# Patient Record
Sex: FEMALE | Race: OTHER | HISPANIC OR LATINO | URBAN - METROPOLITAN AREA
[De-identification: names, ages, dates, MRNs, and addresses within clinical notes are randomized per-mention and may not be internally consistent; named-entity substitution may affect disease eponyms.]

---

## 2018-09-01 ENCOUNTER — INPATIENT (INPATIENT)
Facility: HOSPITAL | Age: 73
LOS: 1 days | Discharge: HOME | DRG: 603 | End: 2018-09-03
Attending: FAMILY MEDICINE | Admitting: HOSPITALIST
Payer: COMMERCIAL

## 2018-09-01 VITALS
WEIGHT: 102.96 LBS | SYSTOLIC BLOOD PRESSURE: 144 MMHG | DIASTOLIC BLOOD PRESSURE: 82 MMHG | HEIGHT: 62 IN | HEART RATE: 78 BPM | RESPIRATION RATE: 16 BRPM | TEMPERATURE: 99 F | OXYGEN SATURATION: 97 %

## 2018-09-01 DIAGNOSIS — Z90.710 ACQUIRED ABSENCE OF BOTH CERVIX AND UTERUS: Chronic | ICD-10-CM

## 2018-09-01 RX ORDER — PIPERACILLIN AND TAZOBACTAM 4; .5 G/20ML; G/20ML
3.38 INJECTION, POWDER, LYOPHILIZED, FOR SOLUTION INTRAVENOUS ONCE
Qty: 0 | Refills: 0 | Status: COMPLETED | OUTPATIENT
Start: 2018-09-01 | End: 2018-09-02

## 2018-09-01 RX ORDER — SODIUM CHLORIDE 9 MG/ML
1000 INJECTION INTRAMUSCULAR; INTRAVENOUS; SUBCUTANEOUS ONCE
Qty: 0 | Refills: 0 | Status: COMPLETED | OUTPATIENT
Start: 2018-09-01 | End: 2018-09-01

## 2018-09-01 RX ORDER — VANCOMYCIN HCL 1 G
1000 VIAL (EA) INTRAVENOUS ONCE
Qty: 0 | Refills: 0 | Status: COMPLETED | OUTPATIENT
Start: 2018-09-01 | End: 2018-09-02

## 2018-09-01 RX ADMIN — SODIUM CHLORIDE 125 MILLILITER(S): 9 INJECTION INTRAMUSCULAR; INTRAVENOUS; SUBCUTANEOUS at 23:55

## 2018-09-01 NOTE — ED PROVIDER NOTE - OBJECTIVE STATEMENT
right leg red & swollen about 1 week  sen by urgent care 8/30 & placed on doxycycline  swelling of lower extremities 73 y/o HF h/o dementia, C/C right leg red & swollen about 5 days, PMD initiated doxycycline orally x 2 days. The  notes increased swelling and redness while on the AB. She was told to go to the hospital by PMD Dr Strauss. No CP, no SOB, no fever, no chills, no stroke symptoms.

## 2018-09-01 NOTE — ED ADULT NURSE NOTE - NSIMPLEMENTINTERV_GEN_ALL_ED
Implemented All Fall Risk Interventions:  [place] to call system. Call bell, personal items and telephone within reach. Instruct patient to call for assistance. Room bathroom lighting operational. Non-slip footwear when patient is off stretcher. Physically safe environment: no spills, clutter or unnecessary equipment. Stretcher in lowest position, wheels locked, appropriate side rails in place. Provide visual cue, wrist band, yellow gown, etc. Monitor gait and stability. Monitor for mental status changes and reorient to person, place, and time. Review medications for side effects contributing to fall risk. Reinforce activity limits and safety measures with patient and family.

## 2018-09-02 DIAGNOSIS — B35.1 TINEA UNGUIUM: ICD-10-CM

## 2018-09-02 DIAGNOSIS — L03.90 CELLULITIS, UNSPECIFIED: ICD-10-CM

## 2018-09-02 DIAGNOSIS — R09.89 OTHER SPECIFIED SYMPTOMS AND SIGNS INVOLVING THE CIRCULATORY AND RESPIRATORY SYSTEMS: ICD-10-CM

## 2018-09-02 DIAGNOSIS — L03.115 CELLULITIS OF RIGHT LOWER LIMB: ICD-10-CM

## 2018-09-02 DIAGNOSIS — G30.9 ALZHEIMER'S DISEASE, UNSPECIFIED: ICD-10-CM

## 2018-09-02 DIAGNOSIS — Z29.9 ENCOUNTER FOR PROPHYLACTIC MEASURES, UNSPECIFIED: ICD-10-CM

## 2018-09-02 DIAGNOSIS — F32.9 MAJOR DEPRESSIVE DISORDER, SINGLE EPISODE, UNSPECIFIED: ICD-10-CM

## 2018-09-02 LAB
ALBUMIN SERPL ELPH-MCNC: 3.2 G/DL — LOW (ref 3.3–5)
ALP SERPL-CCNC: 55 U/L — SIGNIFICANT CHANGE UP (ref 40–120)
ALT FLD-CCNC: 17 U/L — SIGNIFICANT CHANGE UP (ref 12–78)
ANION GAP SERPL CALC-SCNC: 7 MMOL/L — SIGNIFICANT CHANGE UP (ref 5–17)
AST SERPL-CCNC: 17 U/L — SIGNIFICANT CHANGE UP (ref 15–37)
BILIRUB SERPL-MCNC: 0.3 MG/DL — SIGNIFICANT CHANGE UP (ref 0.2–1.2)
BUN SERPL-MCNC: 17 MG/DL — SIGNIFICANT CHANGE UP (ref 7–23)
CALCIUM SERPL-MCNC: 8.5 MG/DL — SIGNIFICANT CHANGE UP (ref 8.5–10.1)
CHLORIDE SERPL-SCNC: 107 MMOL/L — SIGNIFICANT CHANGE UP (ref 96–108)
CO2 SERPL-SCNC: 28 MMOL/L — SIGNIFICANT CHANGE UP (ref 22–31)
CREAT SERPL-MCNC: 0.65 MG/DL — SIGNIFICANT CHANGE UP (ref 0.5–1.3)
ERYTHROCYTE [SEDIMENTATION RATE] IN BLOOD: 12 MM/HR — SIGNIFICANT CHANGE UP (ref 0–20)
GLUCOSE SERPL-MCNC: 101 MG/DL — HIGH (ref 70–99)
HCT VFR BLD CALC: 39.2 % — SIGNIFICANT CHANGE UP (ref 34.5–45)
HGB BLD-MCNC: 12.9 G/DL — SIGNIFICANT CHANGE UP (ref 11.5–15.5)
LACTATE SERPL-SCNC: 0.9 MMOL/L — SIGNIFICANT CHANGE UP (ref 0.7–2)
MCHC RBC-ENTMCNC: 29.9 PG — SIGNIFICANT CHANGE UP (ref 27–34)
MCHC RBC-ENTMCNC: 32.9 GM/DL — SIGNIFICANT CHANGE UP (ref 32–36)
MCV RBC AUTO: 91 FL — SIGNIFICANT CHANGE UP (ref 80–100)
NRBC # BLD: 0 /100 WBCS — SIGNIFICANT CHANGE UP (ref 0–0)
PLATELET # BLD AUTO: 246 K/UL — SIGNIFICANT CHANGE UP (ref 150–400)
POTASSIUM SERPL-MCNC: 4.1 MMOL/L — SIGNIFICANT CHANGE UP (ref 3.5–5.3)
POTASSIUM SERPL-SCNC: 4.1 MMOL/L — SIGNIFICANT CHANGE UP (ref 3.5–5.3)
PROCALCITONIN SERPL-MCNC: <0.05 — SIGNIFICANT CHANGE UP (ref 0–0.04)
PROT SERPL-MCNC: 7 G/DL — SIGNIFICANT CHANGE UP (ref 6–8.3)
RBC # BLD: 4.31 M/UL — SIGNIFICANT CHANGE UP (ref 3.8–5.2)
RBC # FLD: 12.7 % — SIGNIFICANT CHANGE UP (ref 10.3–14.5)
SODIUM SERPL-SCNC: 142 MMOL/L — SIGNIFICANT CHANGE UP (ref 135–145)
WBC # BLD: 7.79 K/UL — SIGNIFICANT CHANGE UP (ref 3.8–10.5)
WBC # FLD AUTO: 7.79 K/UL — SIGNIFICANT CHANGE UP (ref 3.8–10.5)

## 2018-09-02 PROCEDURE — 99223 1ST HOSP IP/OBS HIGH 75: CPT | Mod: AI

## 2018-09-02 PROCEDURE — 99285 EMERGENCY DEPT VISIT HI MDM: CPT

## 2018-09-02 PROCEDURE — 73610 X-RAY EXAM OF ANKLE: CPT | Mod: 26,RT

## 2018-09-02 PROCEDURE — 93010 ELECTROCARDIOGRAM REPORT: CPT

## 2018-09-02 PROCEDURE — 12345: CPT | Mod: NC

## 2018-09-02 PROCEDURE — 93970 EXTREMITY STUDY: CPT | Mod: 26

## 2018-09-02 PROCEDURE — 73630 X-RAY EXAM OF FOOT: CPT | Mod: 26,RT

## 2018-09-02 PROCEDURE — 71045 X-RAY EXAM CHEST 1 VIEW: CPT | Mod: 26

## 2018-09-02 RX ORDER — ACETAMINOPHEN 500 MG
650 TABLET ORAL EVERY 6 HOURS
Qty: 0 | Refills: 0 | Status: DISCONTINUED | OUTPATIENT
Start: 2018-09-02 | End: 2018-09-03

## 2018-09-02 RX ORDER — MIRTAZAPINE 45 MG/1
30 TABLET, ORALLY DISINTEGRATING ORAL AT BEDTIME
Qty: 0 | Refills: 0 | Status: DISCONTINUED | OUTPATIENT
Start: 2018-09-02 | End: 2018-09-03

## 2018-09-02 RX ORDER — INFLUENZA VIRUS VACCINE 15; 15; 15; 15 UG/.5ML; UG/.5ML; UG/.5ML; UG/.5ML
0.5 SUSPENSION INTRAMUSCULAR ONCE
Qty: 0 | Refills: 0 | Status: DISCONTINUED | OUTPATIENT
Start: 2018-09-02 | End: 2018-09-03

## 2018-09-02 RX ORDER — ENOXAPARIN SODIUM 100 MG/ML
40 INJECTION SUBCUTANEOUS EVERY 24 HOURS
Qty: 0 | Refills: 0 | Status: DISCONTINUED | OUTPATIENT
Start: 2018-09-02 | End: 2018-09-03

## 2018-09-02 RX ORDER — SOD,AMMONIUM,POTASSIUM LACTATE
1 CREAM (GRAM) TOPICAL
Qty: 0 | Refills: 0 | Status: DISCONTINUED | OUTPATIENT
Start: 2018-09-02 | End: 2018-09-03

## 2018-09-02 RX ORDER — PIPERACILLIN AND TAZOBACTAM 4; .5 G/20ML; G/20ML
3.38 INJECTION, POWDER, LYOPHILIZED, FOR SOLUTION INTRAVENOUS EVERY 8 HOURS
Qty: 0 | Refills: 0 | Status: DISCONTINUED | OUTPATIENT
Start: 2018-09-02 | End: 2018-09-02

## 2018-09-02 RX ORDER — CEFAZOLIN SODIUM 1 G
1000 VIAL (EA) INJECTION EVERY 8 HOURS
Qty: 0 | Refills: 0 | Status: DISCONTINUED | OUTPATIENT
Start: 2018-09-02 | End: 2018-09-03

## 2018-09-02 RX ORDER — VANCOMYCIN HCL 1 G
750 VIAL (EA) INTRAVENOUS EVERY 12 HOURS
Qty: 0 | Refills: 0 | Status: DISCONTINUED | OUTPATIENT
Start: 2018-09-02 | End: 2018-09-02

## 2018-09-02 RX ORDER — LACTOBACILLUS ACIDOPHILUS 100MM CELL
1 CAPSULE ORAL
Qty: 0 | Refills: 0 | Status: DISCONTINUED | OUTPATIENT
Start: 2018-09-02 | End: 2018-09-03

## 2018-09-02 RX ADMIN — Medication 1 APPLICATION(S): at 17:30

## 2018-09-02 RX ADMIN — Medication 100 MILLIGRAM(S): at 16:18

## 2018-09-02 RX ADMIN — SODIUM CHLORIDE 1000 MILLILITER(S): 9 INJECTION INTRAMUSCULAR; INTRAVENOUS; SUBCUTANEOUS at 00:35

## 2018-09-02 RX ADMIN — Medication 1 TABLET(S): at 17:30

## 2018-09-02 RX ADMIN — Medication 250 MILLIGRAM(S): at 01:27

## 2018-09-02 RX ADMIN — PIPERACILLIN AND TAZOBACTAM 3.38 GRAM(S): 4; .5 INJECTION, POWDER, LYOPHILIZED, FOR SOLUTION INTRAVENOUS at 00:38

## 2018-09-02 RX ADMIN — Medication 100 MILLIGRAM(S): at 22:16

## 2018-09-02 RX ADMIN — PIPERACILLIN AND TAZOBACTAM 25 GRAM(S): 4; .5 INJECTION, POWDER, LYOPHILIZED, FOR SOLUTION INTRAVENOUS at 06:09

## 2018-09-02 RX ADMIN — ENOXAPARIN SODIUM 40 MILLIGRAM(S): 100 INJECTION SUBCUTANEOUS at 02:37

## 2018-09-02 RX ADMIN — MIRTAZAPINE 30 MILLIGRAM(S): 45 TABLET, ORALLY DISINTEGRATING ORAL at 22:14

## 2018-09-02 RX ADMIN — Medication 0.5 MILLIGRAM(S): at 00:25

## 2018-09-02 RX ADMIN — Medication 250 MILLIGRAM(S): at 06:09

## 2018-09-02 RX ADMIN — PIPERACILLIN AND TAZOBACTAM 200 GRAM(S): 4; .5 INJECTION, POWDER, LYOPHILIZED, FOR SOLUTION INTRAVENOUS at 00:05

## 2018-09-02 NOTE — CONSULT NOTE ADULT - SUBJECTIVE AND OBJECTIVE BOX
Infectious Diseases Consult by Ash Crenshaw MD    Reason for Consult :Possible cellulitis right leg    HPI: She is a poor historian no family able to be reached, all hx per chart   71 yo F PMH Dementia and depression brought to ED by  with complaints of RLE erythema and edema X 4 days. She has dementia and per , seldom speaks. Hence, HPI obtained from spouse at bedside. he states he first noticed swelling and redness to  her right ankle 4 days ago, and saw PMD Dr Muse on 8/30. She was sent home from office with prescription of doxycycline and instructed to go to ED with worsening symptoms.  denies fever chills, although he states she is "always cold". She ambulates, and  noticed slight Right sided limp yesterday. denies pain, CP SOB NVD or claudication.     Currently she is sitting in chair in hallway, pleasantly confused         Past Medical & Surgical Hx:  PAST MEDICAL & SURGICAL HISTORY:  Depression  Dementia  H/O abdominal hysterectomy      Social History--   EtOH: denies   Tobacco: denies   Drug Use: denies     FAMILY HISTORY:  Family history of dementia (Sibling)      Allergies    No Known Allergies    Intolerances        Home/ Out patient  Medications :  Home Medications:      Current Inpatient Medications :    ANTIBIOTICS:   piperacillin/tazobactam IVPB. 3.375 Gram(s) IV Intermittent every 8 hours  vancomycin  IVPB 750 milliGRAM(s) IV Intermittent every 12 hours      OTHER RELEVANT MEDICATIONS :  acetaminophen   Tablet. 650 milliGRAM(s) Oral every 6 hours PRN  enoxaparin Injectable 40 milliGRAM(s) SubCutaneous every 24 hours  influenza   Vaccine 0.5 milliLiter(s) IntraMuscular once      ROS:  Unable to obtain due to : dementia     I&O's Detail    01 Sep 2018 07:01  -  02 Sep 2018 07:00  --------------------------------------------------------  IN:    Oral Fluid: 100 mL    Solution: 100 mL    Solution: 250 mL  Total IN: 450 mL    OUT:  Total OUT: 0 mL    Total NET: 450 mL          Physical Exam:  Vital Signs Last 24 Hrs  T(C): 36.6 (02 Sep 2018 14:11), Max: 37.1 (01 Sep 2018 23:17)  T(F): 97.8 (02 Sep 2018 14:11), Max: 98.7 (01 Sep 2018 23:17)  HR: 74 (02 Sep 2018 14:11) (74 - 82)  BP: 148/63 (02 Sep 2018 14:11) (129/69 - 148/63)  BP(mean): --  RR: 17 (02 Sep 2018 14:11) (16 - 18)  SpO2: 99% (02 Sep 2018 14:11) (97% - 99%)  Height (cm): 157.48 (09-01 @ 23:17)  Weight (kg): 46.7 (09-01 @ 23:17)  BMI (kg/m2): 18.8 (09-01 @ 23:17)  BSA (m2): 1.44 (09-01 @ 23:17)    General: elderly female , pleasantly confused , in no acute distress  Eyes: sclera anicteric, pupils equal and reactive to light  ENMT: buccal mucosa moist, pharynx not injected  Neck: supple, trachea midline  Lungs: clear, no wheeze/rhonchi  Cardiovascular: regular rate and rhythm, S1 S2  Abdomen: soft, nontender, no organomegaly present, bowel sounds normal  Neurological:  alert and oriented x3, Cranial Nerves II-XII grossly intact  Skin: no increased ecchymosis/petechiae/purpura  Lymph Nodes: no palpable cervical/supraclavicular lymph nodes enlargements  Extremities: no cyanosis/clubbing, 1 + leg edema R>L , petechial rash, no warmth or erythema . No calf tenderness     Labs:                      12.9   7.79   )----------(  246       ( 02 Sep 2018 00:07 )               39.2      142    |  107    |  17     ----------------------------<  101        ( 02 Sep 2018 00:07 )  4.1     |  28     |  0.65     Ca    8.5        ( 02 Sep 2018 00:07 )    TPro  7.0    /  Alb  3.2    /  TBili  0.3    /  DBili  x      /  AST  17     /  ALT  17     /  AlkPhos  55     ( 02 Sep 2018 00:07 )    LIVER FUNCTIONS - ( 02 Sep 2018 00:07 )  Alb: 3.2 g/dL / Pro: 7.0 g/dL / ALK PHOS: 55 U/L / ALT: 17 U/L / AST: 17 U/L / GGT: x             RECENT CULTURES:    RADIOLOGY & ADDITIONAL STUDIES:  US Duplex Venous Lower Ext Complete, Bilateral (09.02.18 @ 01:39) >  IMPRESSION:     No evidence of bilateral lower extremity deep venous thrombosis.     Xray Foot AP + Lateral + Oblique, Right (09.02.18 @ 00:28) >  Findings: The bones are osteopenic. No fracture or dislocation or   osteolytic/blastic lesion. No gas in the soft tissues. Ankle mortise is   congruent.    Patient has a plantar spur.    Impression:     No osseous abnormality.    Xray Chest 1 View-PORTABLE IMMEDIATE (09.02.18 @ 00:26) >  FINDINGS:     HEART:difficult to access in this projection  LUNGS: free of consolidation or effusion.    OSSEOUS STRUCTURES:: degenerative changes    IMPRESSION:   No infiltrates.      Assessment :     71 yo F PMH Dementia and depression, admitted as possible right leg cellulitis , she most likely has venous insufficiency . In absence of fever or leukocytosis , I doubt she has active infection     Plan :   - will dc Zosyn and Vancomycin , place on IV Ancef 1 gram q 8 hours   - check sed rate, CRP and procalcitonin   - can be dc home on po Keflex 500 mg tid x 7 days   - keep leg elevated  - may need venous support stockings       Continue with present regime .  Appropriate use of antibiotics and adverse effects reviewed.      I have discussed the above plan of care with patient's family in detail. They expressed understanding of the treatment plan . Risks, benefits and alternatives discussed in detail. I have asked if they have any questions or concerns and appropriately addressed them to the best of my ability .      > 45 minutes spent in direct patient care reviewing  the notes, lab data/ imaging , discussion with multidisciplinary team. All questions were addressed and answered to the best of my capacity .    Thank you for allowing me to participate in the care of your patient .      Ash Crenshaw MD  [phone]

## 2018-09-02 NOTE — H&P ADULT - NSHPPHYSICALEXAM_GEN_ALL_CORE
Vital Signs Last 24 Hrs  T(C): 37.1 (01 Sep 2018 23:17), Max: 37.1 (01 Sep 2018 23:17)  T(F): 98.7 (01 Sep 2018 23:17), Max: 98.7 (01 Sep 2018 23:17)  HR: 78 (01 Sep 2018 23:17) (78 - 78)  BP: 144/82 (01 Sep 2018 23:17) (144/82 - 144/82)  BP(mean): --  RR: 16 (01 Sep 2018 23:17) (16 - 16)  SpO2: 97% (01 Sep 2018 23:17) (97% - 97%)    General: Well developed, well nourished, NAD  HEENT: NCAT, PERRLA, EOMI bl, moist mucous membranes   Neck: Supple, nontender, no mass  Neurology:non verbal, spontaneous eye opening, agitated, attempting to remove hospital band/bracelet  Respiratory: CTA B/L, No W/R/R  CV: RRR, +S1/S2, no murmurs, rubs or gallops  Abdominal: Soft, NT, ND +BSx4  Extremities: No C/C/E, + peripheral pulses sachi neg  MSK: Normal ROM, no joint erythema or warmth, no joint swelling   Skin: Right foot dorsum and ankle with erythema and rubor. Nails to Right foot are yellow and thickened.

## 2018-09-02 NOTE — H&P ADULT - NSHPLABSRESULTS_GEN_ALL_CORE
12.9   7.79  )-----------( 246      ( 02 Sep 2018 00:07 )             39.2       09-02    142  |  107  |  17  ----------------------------<  101<H>  4.1   |  28  |  0.65    Ca    8.5      02 Sep 2018 00:07    TPro  7.0  /  Alb  3.2<L>  /  TBili  0.3  /  DBili  x   /  AST  17  /  ALT  17  /  AlkPhos  55  09-02                      Lactate Trend  09-02 @ 00:07 Lactate:0.9             CAPILLARY BLOOD GLUCOSE

## 2018-09-02 NOTE — ED ADULT NURSE REASSESSMENT NOTE - NS ED NURSE REASSESS COMMENT FT1
[id number] Pt seen by Dr. Alexandre iv access initiated right lower hand, blood & blood cultures obtained. IV access 0.9 NS started & in progress. Antibiotic therapy started Zosyn 3.375 G & in progress. Safety precautions observed.  Monitored.

## 2018-09-02 NOTE — DIETITIAN INITIAL EVALUATION ADULT. - PHYSICAL APPEARANCE
BMI 18.8 kg/m2, pt is thin but as per  her weight has been stable for years. Given pt's confusion/dementia, nutrition focused physical exam not performed at this time./underweight/other (specify)

## 2018-09-02 NOTE — ED ADULT NURSE REASSESSMENT NOTE - NS ED NURSE REASSESS COMMENT FT1
0245 Vital signs stable Afebrile., pt admitted. Report to Ms Sofiya HENNING. Pt resting, no distress.

## 2018-09-02 NOTE — DIETITIAN INITIAL EVALUATION ADULT. - ORAL INTAKE PTA
As per , pt with good appetite/intake PTA. States that she typically consumes  foods such as rice and beans. Pt taking Vitamin D3 and B12 prior to admission.

## 2018-09-02 NOTE — DIETITIAN INITIAL EVALUATION ADULT. - OTHER INFO
Pt seen for nutrition assessment. Per chart, pt is 73 y/o F with PMH of dementia and depression admitted for R foot cellulitis.  states UBW as 103 pounds with no recent change in weight.  reports no N/V or diarrhea, endorses some constipation. Last BM PTA. Denies difficulties chewing/swallowing and NKFA. Offered to provide patient's preferences (jello, applesauce) at this time but  declined.

## 2018-09-02 NOTE — DIETITIAN INITIAL EVALUATION ADULT. - PT NOT SOURCE
other (specify)/ able to provide subjective information. Note, pt with h/o advanced alzheimers, dementia and depression - not answering questions appropriately at this time./confused

## 2018-09-02 NOTE — H&P ADULT - ASSESSMENT
73 YO F PMH dementia and depression admitted for Right foot cellulitis, failed outpatient treatment.

## 2018-09-02 NOTE — ED ADULT NURSE REASSESSMENT NOTE - NS ED NURSE REASSESS COMMENT FT1
[id number] Pt had xray done returned to main ED bed 5, Medicated with Ativan 0.5mg ivp as prescribed @ 0025 Safety precautions observed as per protocol.

## 2018-09-02 NOTE — H&P ADULT - ATTENDING COMMENTS
Seen and examined by attending MD. Discussed with patients  at bedside, voicing understanding and agreement to aforementioned plan.

## 2018-09-02 NOTE — DIETITIAN INITIAL EVALUATION ADULT. - ENERGY NEEDS
Wt: 102.7 pounds, Ht:, 61 inches BMI: 18.8 kg/m2, IBW: 110 pounds  +/-10%, %IBW: 93%  +1 R ankle/foot edema, no pressure injuries noted.

## 2018-09-02 NOTE — H&P ADULT - HISTORY OF PRESENT ILLNESS
73 yo F PMH Dementia and depression brought to ED by  with complaints of RLE erytherma and edema X 4 days. She has dementia and per , seldom speaks. Hence, HPI obtained from spouse at bedside. he states he first noticed swelling and redness to  her right ankle 4 days ago, and saw PMD Dr Muse on 8/30. She was sent home from office with prescription of doxycycline and instructed to go to ED with worsening symptoms.  denies fever chills, although he states she is "always cold". She ambulates, and  noticed slight Right sided limp yesterday. denies pain, CP SOB NVD or claudication.

## 2018-09-02 NOTE — DIETITIAN INITIAL EVALUATION ADULT. - NS AS NUTRI INTERV MEALS SNACK
Continue current diet (mechanical soft) as it remains appropriate at this time. Encourage po intake with emphasis on nutrient dense/meals and snacks.

## 2018-09-03 ENCOUNTER — TRANSCRIPTION ENCOUNTER (OUTPATIENT)
Age: 73
End: 2018-09-03

## 2018-09-03 VITALS
SYSTOLIC BLOOD PRESSURE: 109 MMHG | DIASTOLIC BLOOD PRESSURE: 69 MMHG | TEMPERATURE: 97 F | RESPIRATION RATE: 18 BRPM | OXYGEN SATURATION: 91 % | HEART RATE: 73 BPM

## 2018-09-03 LAB
ANION GAP SERPL CALC-SCNC: 7 MMOL/L — SIGNIFICANT CHANGE UP (ref 5–17)
BASOPHILS # BLD AUTO: 0.04 K/UL — SIGNIFICANT CHANGE UP (ref 0–0.2)
BASOPHILS NFR BLD AUTO: 0.8 % — SIGNIFICANT CHANGE UP (ref 0–2)
BUN SERPL-MCNC: 11 MG/DL — SIGNIFICANT CHANGE UP (ref 7–23)
CALCIUM SERPL-MCNC: 8.6 MG/DL — SIGNIFICANT CHANGE UP (ref 8.5–10.1)
CHLORIDE SERPL-SCNC: 108 MMOL/L — SIGNIFICANT CHANGE UP (ref 96–108)
CO2 SERPL-SCNC: 29 MMOL/L — SIGNIFICANT CHANGE UP (ref 22–31)
CREAT SERPL-MCNC: 0.53 MG/DL — SIGNIFICANT CHANGE UP (ref 0.5–1.3)
CRP SERPL-MCNC: 0.49 MG/DL — HIGH (ref 0–0.4)
EOSINOPHIL # BLD AUTO: 0.14 K/UL — SIGNIFICANT CHANGE UP (ref 0–0.5)
EOSINOPHIL NFR BLD AUTO: 2.6 % — SIGNIFICANT CHANGE UP (ref 0–6)
GLUCOSE SERPL-MCNC: 92 MG/DL — SIGNIFICANT CHANGE UP (ref 70–99)
HCT VFR BLD CALC: 39.9 % — SIGNIFICANT CHANGE UP (ref 34.5–45)
HGB BLD-MCNC: 13.5 G/DL — SIGNIFICANT CHANGE UP (ref 11.5–15.5)
IMM GRANULOCYTES NFR BLD AUTO: 0.2 % — SIGNIFICANT CHANGE UP (ref 0–1.5)
LYMPHOCYTES # BLD AUTO: 1.92 K/UL — SIGNIFICANT CHANGE UP (ref 1–3.3)
LYMPHOCYTES # BLD AUTO: 36 % — SIGNIFICANT CHANGE UP (ref 13–44)
MCHC RBC-ENTMCNC: 30.6 PG — SIGNIFICANT CHANGE UP (ref 27–34)
MCHC RBC-ENTMCNC: 33.8 GM/DL — SIGNIFICANT CHANGE UP (ref 32–36)
MCV RBC AUTO: 90.5 FL — SIGNIFICANT CHANGE UP (ref 80–100)
MONOCYTES # BLD AUTO: 0.43 K/UL — SIGNIFICANT CHANGE UP (ref 0–0.9)
MONOCYTES NFR BLD AUTO: 8.1 % — SIGNIFICANT CHANGE UP (ref 2–14)
NEUTROPHILS # BLD AUTO: 2.79 K/UL — SIGNIFICANT CHANGE UP (ref 1.8–7.4)
NEUTROPHILS NFR BLD AUTO: 52.3 % — SIGNIFICANT CHANGE UP (ref 43–77)
PLATELET # BLD AUTO: 233 K/UL — SIGNIFICANT CHANGE UP (ref 150–400)
POTASSIUM SERPL-MCNC: 3.6 MMOL/L — SIGNIFICANT CHANGE UP (ref 3.5–5.3)
POTASSIUM SERPL-SCNC: 3.6 MMOL/L — SIGNIFICANT CHANGE UP (ref 3.5–5.3)
PROCALCITONIN SERPL-MCNC: <0.05 — SIGNIFICANT CHANGE UP (ref 0–0.04)
RBC # BLD: 4.41 M/UL — SIGNIFICANT CHANGE UP (ref 3.8–5.2)
RBC # FLD: 12.7 % — SIGNIFICANT CHANGE UP (ref 10.3–14.5)
SODIUM SERPL-SCNC: 144 MMOL/L — SIGNIFICANT CHANGE UP (ref 135–145)
WBC # BLD: 5.33 K/UL — SIGNIFICANT CHANGE UP (ref 3.8–10.5)
WBC # FLD AUTO: 5.33 K/UL — SIGNIFICANT CHANGE UP (ref 3.8–10.5)

## 2018-09-03 PROCEDURE — 83605 ASSAY OF LACTIC ACID: CPT

## 2018-09-03 PROCEDURE — 86140 C-REACTIVE PROTEIN: CPT

## 2018-09-03 PROCEDURE — 85027 COMPLETE CBC AUTOMATED: CPT

## 2018-09-03 PROCEDURE — 71045 X-RAY EXAM CHEST 1 VIEW: CPT

## 2018-09-03 PROCEDURE — 84145 PROCALCITONIN (PCT): CPT

## 2018-09-03 PROCEDURE — G8978: CPT | Mod: CK

## 2018-09-03 PROCEDURE — 93005 ELECTROCARDIOGRAM TRACING: CPT

## 2018-09-03 PROCEDURE — 99239 HOSP IP/OBS DSCHRG MGMT >30: CPT

## 2018-09-03 PROCEDURE — 73610 X-RAY EXAM OF ANKLE: CPT

## 2018-09-03 PROCEDURE — 93970 EXTREMITY STUDY: CPT

## 2018-09-03 PROCEDURE — 80048 BASIC METABOLIC PNL TOTAL CA: CPT

## 2018-09-03 PROCEDURE — G8980: CPT | Mod: CK

## 2018-09-03 PROCEDURE — 87040 BLOOD CULTURE FOR BACTERIA: CPT

## 2018-09-03 PROCEDURE — G8979: CPT | Mod: CK

## 2018-09-03 PROCEDURE — 97161 PT EVAL LOW COMPLEX 20 MIN: CPT

## 2018-09-03 PROCEDURE — 73630 X-RAY EXAM OF FOOT: CPT

## 2018-09-03 PROCEDURE — 80053 COMPREHEN METABOLIC PANEL: CPT

## 2018-09-03 PROCEDURE — 85652 RBC SED RATE AUTOMATED: CPT

## 2018-09-03 PROCEDURE — 99285 EMERGENCY DEPT VISIT HI MDM: CPT | Mod: 25

## 2018-09-03 PROCEDURE — 96365 THER/PROPH/DIAG IV INF INIT: CPT

## 2018-09-03 RX ORDER — LACTOBACILLUS ACIDOPHILUS 100MM CELL
1 CAPSULE ORAL
Qty: 21 | Refills: 0 | OUTPATIENT
Start: 2018-09-03 | End: 2018-09-09

## 2018-09-03 RX ORDER — CEPHALEXIN 500 MG
500 CAPSULE ORAL THREE TIMES A DAY
Qty: 0 | Refills: 0 | Status: DISCONTINUED | OUTPATIENT
Start: 2018-09-03 | End: 2018-09-03

## 2018-09-03 RX ORDER — CEPHALEXIN 500 MG
1 CAPSULE ORAL
Qty: 21 | Refills: 0 | OUTPATIENT
Start: 2018-09-03 | End: 2018-09-09

## 2018-09-03 RX ADMIN — Medication 1 APPLICATION(S): at 05:20

## 2018-09-03 RX ADMIN — Medication 100 MILLIGRAM(S): at 05:20

## 2018-09-03 RX ADMIN — ENOXAPARIN SODIUM 40 MILLIGRAM(S): 100 INJECTION SUBCUTANEOUS at 01:44

## 2018-09-03 RX ADMIN — Medication 1 TABLET(S): at 09:04

## 2018-09-03 NOTE — DISCHARGE NOTE ADULT - PROVIDER TOKENS
FREE:[LAST:[henok],FIRST:[Dr. Lujan],PHONE:[phone]],FAX:[(   )    -],ADDRESS:[Dr. Tai Strauss    Internal medicine    [address] [place] [address]    [phone]]],TOKEN:'[id number]'

## 2018-09-03 NOTE — PROGRESS NOTE ADULT - SUBJECTIVE AND OBJECTIVE BOX
ID Progress note     Name: LUPIS RIVERO  Age: 72y  Gender: Female  MRN: [id number]    Interval History-- Events noted, OOB to chair in hallway, confused .Afebrile   Notes reviewed    Past Medical History--  Depression  Dementia  H/O abdominal hysterectomy  No significant past surgical history      For details regarding the patient's social history, family history, and other miscellaneous elements, please refer the initial infectious diseases consultation and/or the admitting history and physical examination for this admission.    Allergies--  Allergies    No Known Allergies    Intolerances        Medications--  Antibiotics:  ceFAZolin   IVPB 1000 milliGRAM(s) IV Intermittent every 8 hours    Immunologic:  influenza   Vaccine 0.5 milliLiter(s) IntraMuscular once    Other:  acetaminophen   Tablet. PRN  ammonium lactate 12% Lotion  enoxaparin Injectable  lactobacillus acidophilus  mirtazapine      Review of Systems--  Review of systems unable to be obtained secondary to clinical condition.    Physical Examination--    Vital Signs: T(F): 97.3 (09-03-18 @ 13:45), Max: 97.8 (09-02-18 @ 14:11)  HR: 73 (09-03-18 @ 13:45)  BP: 109/69 (09-03-18 @ 13:45)  RR: 18 (09-03-18 @ 13:45)  SpO2: 91% (09-03-18 @ 13:45)  Wt(kg): --  General: elderly female , pleasantly confused , in no acute distress  Eyes: sclera anicteric, pupils equal and reactive to light  ENMT: buccal mucosa moist, pharynx not injected  Neck: supple, trachea midline  Lungs: clear, no wheeze/rhonchi  Cardiovascular: regular rate and rhythm, S1 S2  Abdomen: soft, nontender, no organomegaly present, bowel sounds normal  Neurological:  alert and oriented x3, Cranial Nerves II-XII grossly intact  Skin: no increased ecchymosis/petechiae/purpura  Lymph Nodes: no palpable cervical/supraclavicular lymph nodes enlargements  Extremities: no cyanosis/clubbing, 1 + leg edema R>L , petechial rash, no warmth or erythema . No calf tenderness   Laboratory Studies--  CBC                        13.5   5.33  )-----------( 233      ( 03 Sep 2018 09:35 )             39.9       Chemistries  09-03    144  |  108  |  11  ----------------------------<  92  3.6   |  29  |  0.53    Ca    8.6      03 Sep 2018 09:35    TPro  7.0  /  Alb  3.2<L>  /  TBili  0.3  /  DBili  x   /  AST  17  /  ALT  17  /  AlkPhos  55  09-02    Procalcitonin, Serum (09.03.18 @ 09:35)    Procalcitonin, Serum: <0.05:     Sedimentation Rate, Erythrocyte (09.02.18 @ 15:41)    Sedimentation Rate, Erythrocyte: 12 mm/hr    C-Reactive Protein, Serum (09.02.18 @ 23:14)    C-Reactive Protein, Serum: 0.49 mg/dL      Recent Cultures:    Culture - Blood (collected 02 Sep 2018 09:49)  Source: .Blood Blood  Preliminary Report (03 Sep 2018 10:01):    No growth to date.    Culture - Blood (collected 02 Sep 2018 09:49)  Source: .Blood Blood  Preliminary Report (03 Sep 2018 10:01):    No growth to date.  Assessment :     73 yo F PMH Dementia and depression, admitted as possible right leg cellulitis , she most likely has venous insufficiency . In absence of fever or leukocytosis , I doubt she has active infection   All sero markers for infection are negative   Plan :   - will chanhe to po Keflex x 7 days    - can be dc home - keep leg elevated  - may need venous support stockings     Continue with present regime .  Appropriate use of antibiotics and adverse effects reviewed.    I have discussed the above plan of care with patient's family in detail. They expressed understanding of the treatment plan . Risks, benefits and alternatives discussed in detail. I have asked if they have any questions or concerns and appropriately addressed them to the best of my ability .      > 15 minutes spent in direct patient care reviewing  the notes, lab data/ imaging , discussion with multidisciplinary team. All questions were addressed and answered to the best of my capacity .    Thank you for allowing me to participate in the care of your patient .        Ash Crenshaw MD  [phone]
Patient is a 72y old  Female who presents with a chief complaint of  R ankle redness and swelling    INTERVAL HPI: Pt seen and examined. unable to obtain history as pt confused and has dementia.     OVERNIGHT EVENTS: none noted  T(F): 97.2 (09-02-18 @ 02:30), Max: 98.7 (09-01-18 @ 23:17)  HR: 76 (09-02-18 @ 02:30) (76 - 82)  BP: 129/69 (09-02-18 @ 02:30) (129/69 - 144/82)  RR: 18 (09-02-18 @ 02:30) (16 - 18)  SpO2: 97% (09-02-18 @ 02:30) (97% - 97%)  I&O's Summary    01 Sep 2018 07:01  -  02 Sep 2018 07:00  --------------------------------------------------------  IN: 450 mL / OUT: 0 mL / NET: 450 mL        REVIEW OF SYSTEMS: unable due to dementia    PHYSICAL EXAM:  GENERAL: NAD, well-groomed, well-developed  HEAD:  Atraumatic, Normocephalic  EYES: EOMI, PERRLA, conjunctiva and sclera clear  ENMT: No tonsillar erythema, exudates, or enlargement; Moist mucous membranes, Good dentition, No lesions  NECK: Supple, No JVD  NERVOUS SYSTEM:  Alert & Oriented X1, dementia, Motor Strength 5/5 B/L upper and lower extremities  CHEST/LUNG: Clear to percussion bilaterally; No rales, rhonchi, wheezing, or rubs  HEART: Regular rate and rhythm; No murmurs, rubs, or gallops  ABDOMEN: Soft, Nontender, Nondistended; Bowel sounds present  EXTREMITIES:  2+ Peripheral Pulses, R ankle edema and erythema  SKIN: R ankle erythema and mild edema    LABS:                        12.9   7.79  )-----------( 246      ( 02 Sep 2018 00:07 )             39.2     09-02    142  |  107  |  17  ----------------------------<  101<H>  4.1   |  28  |  0.65    Ca    8.5      02 Sep 2018 00:07    TPro  7.0  /  Alb  3.2<L>  /  TBili  0.3  /  DBili  x   /  AST  17  /  ALT  17  /  AlkPhos  55  09-02        CAPILLARY BLOOD GLUCOSE                  MEDICATIONS  (STANDING):  enoxaparin Injectable 40 milliGRAM(s) SubCutaneous every 24 hours  influenza   Vaccine 0.5 milliLiter(s) IntraMuscular once  lactobacillus acidophilus 1 Tablet(s) Oral two times a day with meals  piperacillin/tazobactam IVPB. 3.375 Gram(s) IV Intermittent every 8 hours  vancomycin  IVPB 750 milliGRAM(s) IV Intermittent every 12 hours    MEDICATIONS  (PRN):  acetaminophen   Tablet. 650 milliGRAM(s) Oral every 6 hours PRN Mild Pain (1 - 3)

## 2018-09-03 NOTE — DISCHARGE NOTE ADULT - PATIENT PORTAL LINK FT
You can access the [facility], offered by [facility] [place], by registering with the following website: [email or web address]

## 2018-09-03 NOTE — PHYSICAL THERAPY INITIAL EVALUATION ADULT - ADDITIONAL COMMENTS
Information obtained from Case Management- pt unable to provide any history: pt lives with her spouse who is her caretaker.

## 2018-09-03 NOTE — DISCHARGE NOTE ADULT - SECONDARY DIAGNOSIS.
Alzheimer's dementia with behavioral disturbance, unspecified timing of dementia onset Onychomycosis

## 2018-09-03 NOTE — DISCHARGE NOTE ADULT - CARE PLAN
Principal Discharge DX:	Cellulitis of right lower extremity  Goal:	resolution  Assessment and plan of treatment:	-please complete antibiotic keflex 500mg oral every 8 hours for 7 days  -follow up wiith your primary medical physician within 3-5 days of discharge for a hospital follow up appt  -pt's  to setup follow up appt  Secondary Diagnosis:	Alzheimer's dementia with behavioral disturbance, unspecified timing of dementia onset  Goal:	stable  Assessment and plan of treatment:	-cont home meds and supportive care  Secondary Diagnosis:	Onychomycosis  Goal:	resolution  Assessment and plan of treatment:	-ask your primary doctor to refer you to an outpatient podiatrist and/or Dr. Ibrahim podiatry

## 2018-09-03 NOTE — DISCHARGE NOTE ADULT - CARE PROVIDER_API CALL
Dr. Tai whiting Dr.    Internal medicine    [address] [place] [address]    [phone]  Phone: [phone]  Fax: (   )    -    Ede Ibrahim (DPM), Podiatric Medicine and Surgery  [address] [place] [address]  Phone: [phone]  Fax: [phone]

## 2018-09-03 NOTE — DISCHARGE NOTE ADULT - ADDITIONAL INSTRUCTIONS
-please complete a hospital discharge follow up with your primary care physician Dr. Strauss within 3-5 days of discharge, also please follow up with podiatry for your chronic nail changes  -pt's  and family responsible to setup all follow up appts

## 2018-09-03 NOTE — DISCHARGE NOTE ADULT - PLAN OF CARE
resolution -please complete antibiotic keflex 500mg oral every 8 hours for 7 days  -follow up wiith your primary medical physician within 3-5 days of discharge for a hospital follow up appt  -pt's  to setup follow up appt stable -cont home meds and supportive care -ask your primary doctor to refer you to an outpatient podiatrist and/or Dr. Ibrahim podiatry

## 2018-09-03 NOTE — DISCHARGE NOTE ADULT - MEDICATION SUMMARY - MEDICATIONS TO TAKE
I will START or STAY ON the medications listed below when I get home from the hospital:    Remeron 30 mg oral tablet  -- 1 tab(s) by mouth once a day (at bedtime)  -- Indication: For Dementia    Keflex 500 mg oral capsule  -- 1 cap(s) by mouth every 8 hours   -- Finish all this medication unless otherwise directed by prescriber.    -- Indication: For Cellulitis of right lower extremity    lactobacillus acidophilus oral capsule  -- 1 cap(s) by mouth 3 times a day (with meals)   -- Indication: For Cellulitis of right lower extremity

## 2018-09-07 LAB
CULTURE RESULTS: SIGNIFICANT CHANGE UP
CULTURE RESULTS: SIGNIFICANT CHANGE UP
SPECIMEN SOURCE: SIGNIFICANT CHANGE UP
SPECIMEN SOURCE: SIGNIFICANT CHANGE UP

## 2018-10-05 PROBLEM — F03.90 UNSPECIFIED DEMENTIA WITHOUT BEHAVIORAL DISTURBANCE: Chronic | Status: ACTIVE | Noted: 2018-09-01

## 2018-10-05 PROBLEM — F32.9 MAJOR DEPRESSIVE DISORDER, SINGLE EPISODE, UNSPECIFIED: Chronic | Status: ACTIVE | Noted: 2018-09-01

## 2018-10-18 ENCOUNTER — APPOINTMENT (OUTPATIENT)
Dept: PSYCHIATRY | Facility: CLINIC | Age: 73
End: 2018-10-18
Payer: MEDICARE

## 2018-10-18 PROBLEM — Z00.00 ENCOUNTER FOR PREVENTIVE HEALTH EXAMINATION: Status: ACTIVE | Noted: 2018-10-18

## 2018-10-18 PROCEDURE — 99205 OFFICE O/P NEW HI 60 MIN: CPT

## 2018-11-09 ENCOUNTER — APPOINTMENT (OUTPATIENT)
Dept: PSYCHIATRY | Facility: CLINIC | Age: 73
End: 2018-11-09

## 2018-11-20 ENCOUNTER — APPOINTMENT (OUTPATIENT)
Dept: PSYCHIATRY | Facility: CLINIC | Age: 73
End: 2018-11-20
Payer: MEDICARE

## 2018-11-20 PROCEDURE — 99214 OFFICE O/P EST MOD 30 MIN: CPT

## 2019-01-24 ENCOUNTER — INPATIENT (INPATIENT)
Facility: HOSPITAL | Age: 74
LOS: 0 days | Discharge: HOME | DRG: 379 | End: 2019-01-25
Attending: INTERNAL MEDICINE | Admitting: INTERNAL MEDICINE
Payer: MEDICARE

## 2019-01-24 VITALS
WEIGHT: 102.96 LBS | TEMPERATURE: 97 F | SYSTOLIC BLOOD PRESSURE: 155 MMHG | HEIGHT: 62 IN | HEART RATE: 103 BPM | RESPIRATION RATE: 18 BRPM | DIASTOLIC BLOOD PRESSURE: 55 MMHG

## 2019-01-24 DIAGNOSIS — F32.9 MAJOR DEPRESSIVE DISORDER, SINGLE EPISODE, UNSPECIFIED: ICD-10-CM

## 2019-01-24 DIAGNOSIS — K92.2 GASTROINTESTINAL HEMORRHAGE, UNSPECIFIED: ICD-10-CM

## 2019-01-24 DIAGNOSIS — F03.90 UNSPECIFIED DEMENTIA WITHOUT BEHAVIORAL DISTURBANCE: ICD-10-CM

## 2019-01-24 DIAGNOSIS — Z90.710 ACQUIRED ABSENCE OF BOTH CERVIX AND UTERUS: Chronic | ICD-10-CM

## 2019-01-24 DIAGNOSIS — K62.5 HEMORRHAGE OF ANUS AND RECTUM: ICD-10-CM

## 2019-01-24 DIAGNOSIS — Z29.9 ENCOUNTER FOR PROPHYLACTIC MEASURES, UNSPECIFIED: ICD-10-CM

## 2019-01-24 LAB
ALBUMIN SERPL ELPH-MCNC: 3.6 G/DL — SIGNIFICANT CHANGE UP (ref 3.3–5)
ALP SERPL-CCNC: 73 U/L — SIGNIFICANT CHANGE UP (ref 40–120)
ALT FLD-CCNC: 22 U/L — SIGNIFICANT CHANGE UP (ref 12–78)
ANION GAP SERPL CALC-SCNC: 7 MMOL/L — SIGNIFICANT CHANGE UP (ref 5–17)
APTT BLD: 31.2 SEC — SIGNIFICANT CHANGE UP (ref 28.5–37)
AST SERPL-CCNC: 18 U/L — SIGNIFICANT CHANGE UP (ref 15–37)
BASE EXCESS BLDV CALC-SCNC: 0.7 MMOL/L — SIGNIFICANT CHANGE UP (ref -2–2)
BASOPHILS # BLD AUTO: 0.04 K/UL — SIGNIFICANT CHANGE UP (ref 0–0.2)
BASOPHILS NFR BLD AUTO: 0.4 % — SIGNIFICANT CHANGE UP (ref 0–2)
BILIRUB SERPL-MCNC: 0.7 MG/DL — SIGNIFICANT CHANGE UP (ref 0.2–1.2)
BUN SERPL-MCNC: 19 MG/DL — SIGNIFICANT CHANGE UP (ref 7–23)
CALCIUM SERPL-MCNC: 9.3 MG/DL — SIGNIFICANT CHANGE UP (ref 8.5–10.1)
CHLORIDE SERPL-SCNC: 106 MMOL/L — SIGNIFICANT CHANGE UP (ref 96–108)
CO2 SERPL-SCNC: 27 MMOL/L — SIGNIFICANT CHANGE UP (ref 22–31)
CREAT SERPL-MCNC: 0.8 MG/DL — SIGNIFICANT CHANGE UP (ref 0.5–1.3)
EOSINOPHIL # BLD AUTO: 0.03 K/UL — SIGNIFICANT CHANGE UP (ref 0–0.5)
EOSINOPHIL NFR BLD AUTO: 0.3 % — SIGNIFICANT CHANGE UP (ref 0–6)
GLUCOSE SERPL-MCNC: 132 MG/DL — HIGH (ref 70–99)
HCO3 BLDV-SCNC: 26 MMOL/L — SIGNIFICANT CHANGE UP (ref 21–29)
HCT VFR BLD CALC: 39.6 % — SIGNIFICANT CHANGE UP (ref 34.5–45)
HCT VFR BLD CALC: 42.5 % — SIGNIFICANT CHANGE UP (ref 34.5–45)
HGB BLD-MCNC: 13.5 G/DL — SIGNIFICANT CHANGE UP (ref 11.5–15.5)
HGB BLD-MCNC: 14.2 G/DL — SIGNIFICANT CHANGE UP (ref 11.5–15.5)
HOROWITZ INDEX BLDV+IHG-RTO: 21 — SIGNIFICANT CHANGE UP
IMM GRANULOCYTES NFR BLD AUTO: 0.3 % — SIGNIFICANT CHANGE UP (ref 0–1.5)
INR BLD: 1.04 RATIO — SIGNIFICANT CHANGE UP (ref 0.88–1.16)
LYMPHOCYTES # BLD AUTO: 1.25 K/UL — SIGNIFICANT CHANGE UP (ref 1–3.3)
LYMPHOCYTES # BLD AUTO: 12.8 % — LOW (ref 13–44)
MCHC RBC-ENTMCNC: 29.4 PG — SIGNIFICANT CHANGE UP (ref 27–34)
MCHC RBC-ENTMCNC: 33.4 GM/DL — SIGNIFICANT CHANGE UP (ref 32–36)
MCV RBC AUTO: 88 FL — SIGNIFICANT CHANGE UP (ref 80–100)
MONOCYTES # BLD AUTO: 0.65 K/UL — SIGNIFICANT CHANGE UP (ref 0–0.9)
MONOCYTES NFR BLD AUTO: 6.7 % — SIGNIFICANT CHANGE UP (ref 2–14)
NEUTROPHILS # BLD AUTO: 7.73 K/UL — HIGH (ref 1.8–7.4)
NEUTROPHILS NFR BLD AUTO: 79.5 % — HIGH (ref 43–77)
PCO2 BLDV: 27 MMHG — LOW (ref 35–50)
PH BLDV: 7.55 — HIGH (ref 7.35–7.45)
PLATELET # BLD AUTO: 249 K/UL — SIGNIFICANT CHANGE UP (ref 150–400)
PO2 BLDV: 65 MMHG — HIGH (ref 25–45)
POTASSIUM SERPL-MCNC: 3.8 MMOL/L — SIGNIFICANT CHANGE UP (ref 3.5–5.3)
POTASSIUM SERPL-SCNC: 3.8 MMOL/L — SIGNIFICANT CHANGE UP (ref 3.5–5.3)
PROT SERPL-MCNC: 7.3 G/DL — SIGNIFICANT CHANGE UP (ref 6–8.3)
PROTHROM AB SERPL-ACNC: 11.9 SEC — SIGNIFICANT CHANGE UP (ref 10–12.9)
RBC # BLD: 4.83 M/UL — SIGNIFICANT CHANGE UP (ref 3.8–5.2)
RBC # FLD: 12.9 % — SIGNIFICANT CHANGE UP (ref 10.3–14.5)
SAO2 % BLDV: 95 % — HIGH (ref 67–88)
SODIUM SERPL-SCNC: 140 MMOL/L — SIGNIFICANT CHANGE UP (ref 135–145)
WBC # BLD: 9.73 K/UL — SIGNIFICANT CHANGE UP (ref 3.8–10.5)
WBC # FLD AUTO: 9.73 K/UL — SIGNIFICANT CHANGE UP (ref 3.8–10.5)

## 2019-01-24 PROCEDURE — 99223 1ST HOSP IP/OBS HIGH 75: CPT | Mod: GC,AI

## 2019-01-24 PROCEDURE — 93010 ELECTROCARDIOGRAM REPORT: CPT

## 2019-01-24 PROCEDURE — 99285 EMERGENCY DEPT VISIT HI MDM: CPT

## 2019-01-24 PROCEDURE — 71045 X-RAY EXAM CHEST 1 VIEW: CPT | Mod: 26

## 2019-01-24 RX ORDER — ACETAMINOPHEN 500 MG
650 TABLET ORAL EVERY 6 HOURS
Qty: 0 | Refills: 0 | Status: DISCONTINUED | OUTPATIENT
Start: 2019-01-24 | End: 2019-01-25

## 2019-01-24 RX ORDER — ALPRAZOLAM 0.25 MG
0.25 TABLET ORAL ONCE
Qty: 0 | Refills: 0 | Status: DISCONTINUED | OUTPATIENT
Start: 2019-01-24 | End: 2019-01-24

## 2019-01-24 RX ORDER — ALPRAZOLAM 0.25 MG
0.25 TABLET ORAL THREE TIMES A DAY
Qty: 0 | Refills: 0 | Status: DISCONTINUED | OUTPATIENT
Start: 2019-01-24 | End: 2019-01-25

## 2019-01-24 RX ORDER — MIRTAZAPINE 45 MG/1
30 TABLET, ORALLY DISINTEGRATING ORAL AT BEDTIME
Qty: 0 | Refills: 0 | Status: DISCONTINUED | OUTPATIENT
Start: 2019-01-24 | End: 2019-01-25

## 2019-01-24 RX ORDER — SODIUM CHLORIDE 9 MG/ML
1000 INJECTION INTRAMUSCULAR; INTRAVENOUS; SUBCUTANEOUS
Qty: 0 | Refills: 0 | Status: DISCONTINUED | OUTPATIENT
Start: 2019-01-24 | End: 2019-01-25

## 2019-01-24 RX ORDER — SODIUM CHLORIDE 9 MG/ML
1000 INJECTION INTRAMUSCULAR; INTRAVENOUS; SUBCUTANEOUS ONCE
Qty: 0 | Refills: 0 | Status: COMPLETED | OUTPATIENT
Start: 2019-01-24 | End: 2019-01-24

## 2019-01-24 RX ORDER — MIRTAZAPINE 45 MG/1
1 TABLET, ORALLY DISINTEGRATING ORAL
Qty: 0 | Refills: 0 | COMMUNITY

## 2019-01-24 RX ORDER — PANTOPRAZOLE SODIUM 20 MG/1
40 TABLET, DELAYED RELEASE ORAL
Qty: 0 | Refills: 0 | Status: DISCONTINUED | OUTPATIENT
Start: 2019-01-24 | End: 2019-01-25

## 2019-01-24 RX ADMIN — PANTOPRAZOLE SODIUM 40 MILLIGRAM(S): 20 TABLET, DELAYED RELEASE ORAL at 14:30

## 2019-01-24 RX ADMIN — SODIUM CHLORIDE 1000 MILLILITER(S): 9 INJECTION INTRAMUSCULAR; INTRAVENOUS; SUBCUTANEOUS at 13:59

## 2019-01-24 RX ADMIN — SODIUM CHLORIDE 1000 MILLILITER(S): 9 INJECTION INTRAMUSCULAR; INTRAVENOUS; SUBCUTANEOUS at 14:10

## 2019-01-24 RX ADMIN — Medication 0.25 MILLIGRAM(S): at 14:30

## 2019-01-24 RX ADMIN — SODIUM CHLORIDE 75 MILLILITER(S): 9 INJECTION INTRAMUSCULAR; INTRAVENOUS; SUBCUTANEOUS at 17:23

## 2019-01-24 RX ADMIN — MIRTAZAPINE 30 MILLIGRAM(S): 45 TABLET, ORALLY DISINTEGRATING ORAL at 22:21

## 2019-01-24 NOTE — ED PROVIDER NOTE - MEDICAL DECISION MAKING DETAILS
74 y/o female hx dementia and GERD on PPI presents for rectal bleeding. patients  relates it has been fairly large volume brbpr but cannot quantify. not on AC. no hx of this in past. colonoscopy/ endoscopy may years ago. patient mildly agitated on exam but is baseline per , mildly tachycardic with clear lungs, abdomen soft nontender and nondistended. rectal exam reveals + brbpr in vault. will obtain labs including type and screen, hydrate with IVF, transfuse as needed and admit to the hospital - Kristin Karimi PA-C

## 2019-01-24 NOTE — PATIENT PROFILE ADULT - RESOURCE/ENVIRONMENTAL CONCERNS - OTHER
per spouse" my daughter has started working on trying to get my wife some assistance at home but can I have the  call my daughter and me?" per spouse" my daughter has started working on trying to get my wife some assistance at home but can I have the  call my daughter and me?" Per dtr pt" was just approved for Community Medicaid"

## 2019-01-24 NOTE — H&P ADULT - PROBLEM SELECTOR PLAN 1
NPO w/ IVF (NS 75cc/hr)  GI consulted  H&H currently stable (14.2/42.5), will continue to trend q6hr  FU Hepatic Panel  Type and Screen ordered  Continue Pantoprazole 40mg (home med)  Tylenol for mild pain/Temp PRN q6hr  No anticoag at this time Hemodynamically Stable  NPO w/ IVF (NS 75cc/hr) for possible C-scope vs EGD  GI consulted  H&H currently stable (14.2/42.5), will continue to trend q6hr  FU Hepatic Panel  Type and Screen ordered  Continue Pantoprazole 40mg (home med)  Tylenol for mild pain/Temp PRN q6hr  Hold AC at this time Hemodynamically Stable, Respiratory Alkalosis on VBG likely due to anxiety/hyperventilation  NPO w/ IVF (NS 75cc/hr) for possible C-scope vs EGD  GI consulted  H&H currently stable (14.2/42.5), will continue to trend q6hr  FU Hepatic Panel  Type and Screen ordered  Continue Pantoprazole 40mg (home med)  Tylenol for mild pain/Temp PRN q6hr  Hold AC at this time

## 2019-01-24 NOTE — PATIENT PROFILE ADULT - NSPROMEDSADMININFO_GEN_A_NUR
crush pills for administration/administer in food/"She does not want to swallow the meds so I crush them and put in apple sauce" per spouse

## 2019-01-24 NOTE — PATIENT PROFILE ADULT - NSPROGENSOURCEINFO_GEN_A_NUR
patient/health record/family pt poor historian, non verbal, decreased vision issues per spouse.  Spouse assisted with Hx and home med rec  Copy Forward H&P from 1/24/2019 info verified with spouse @ bedside/family/patient/health record

## 2019-01-24 NOTE — H&P ADULT - HISTORY OF PRESENT ILLNESS
72yo F w/ PMHx dementia, reflux on PPI presents w/ rectal bleeding since last night. 74yo F w/ PMHx dementia, GERD (PPI), Hx RLE Cellulitis s/p ABx (9/2/18) presents w/ BRBPR since last night. Pt's  at bedside states blood present only w/ BM, her last EGD/C-scope was many years ago. Unable to obtain further Hx of present illness due to pt's baseline mental status.     In ED VS:   1L NS x1, 74yo F w/ PMHx dementia, GERD (PPI), Hx RLE Cellulitis s/p ABx (9/2/18) presents w/ BRBPR since last night. Pt's  at bedside states significant amount of blood present only w/ BM, her last EGD/C-scope was many years ago. Unable to obtain further Hx of present illness due to pt's baseline mental status. Not on anticoagulation at home.     In ED VS:  T36.3   P103   RR18   /55  1L NS x1, 72yo F w/ PMHx dementia, GERD (PPI), Hx RLE Cellulitis s/p ABx (9/2/18) presents w/ BRBPR since last night. Pt's  at bedside states pt had one loose bowel movement w/ quarter size amount of blood on toilet paper when he helped her wipe.  He denies previous episodes of this nature, no Hx of Hemorrhoids no sick contacts at home. Her last EGD/C-scope was many years ago ( does not know exact date).  does not believe pt has had fever, chills or abdominal pain. Unable to obtain further Hx of present illness due to pt's baseline mental status. Not on anticoagulation at home. Last PO Intake 1/24/19 @ 1430 when  gave pt applesauce w/ some Xanax.    In ED VS:  T36.3   P103   RR18   /55  1L NS x1, Alprazolam x1 (given by ) 72yo F w/ PMHx dementia, GERD (PPI), Hx RLE Cellulitis s/p ABx (9/2/18) presents w/ BRBPR since last night. Pt's  at bedside states pt had one loose bowel movement w/ quarter size amount of blood on toilet paper when he helped her wipe.  He denies previous episodes of this nature, no Hx of Hemorrhoids no sick contacts at home. Her last EGD/C-scope was many years ago ( does not know exact date).  does not believe pt has had fever, chills or abdominal pain. Unable to obtain further Hx of present illness due to pt's baseline mental status. Not on anticoagulation at home. Last PO Intake 1/24/19 @ 1430 when  gave pt applesauce w/ some Xanax.    In ED VS:  T36.3   P103   RR18   /55  H&H 14.2/42.5   VBG pH7.55  CO2 27  O2 65 Sat 95  1L NS x1, Alprazolam x1 (given by ) 74yo F w/ PMHx dementia, GERD (PPI), Hx RLE Cellulitis s/p ABx (9/2/18) presents w/ BRBPR since last night. Pt's  at bedside states pt had one loose bowel movement w/ quarter size amount of blood on toilet paper when he helped her wipe. ED attending reports that pt had large bloody BM in the ED. Spouse does not recall this event. He denies previous episodes of this nature, no Hx of Hemorrhoids no sick contacts at home. Her last EGD/C-scope was many years ago ( does not know exact date).  does not believe pt has had fever, chills or abdominal pain. Unable to obtain further Hx of present illness due to pt's baseline mental status. Not on anticoagulation at home. Last PO Intake 1/24/19 @ 1430 when  gave pt applesauce w/ some Xanax.    In ED VS:  T36.3   P103   RR18   /55  H&H 14.2/42.5   VBG pH7.55  CO2 27  O2 65 Sat 95  1L NS x1, Alprazolam x1 (given by )

## 2019-01-24 NOTE — H&P ADULT - ASSESSMENT
72yo F w/ PMHx dementia, GERD (PPI), Hx RLE Cellulitis s/p ABx (9/2/18) presents w/ BRBPR since last night, now being admitted to medicine for further workup and evaluation of GIB.

## 2019-01-24 NOTE — H&P ADULT - ATTENDING COMMENTS
- suspected lower GIB, persistent in the ED, only passing blood w/ bm's, not a continuous bleed, h/h stable, vitals stable, gi consult, npo, ppi, plan for colon - suspected lower GIB, persistent in the ED, only passing blood w/ bm's, not a continuous bleed, h/h stable, vitals stable, gi consult, npo, ppi, plan for colon if indicated

## 2019-01-24 NOTE — PATIENT PROFILE ADULT - NSPROEDAREADYLEARN_GEN_A_NUR
motivation to learn/confused/acuteness of illness motivation to learn/acuteness of illness/confused.

## 2019-01-24 NOTE — H&P ADULT - NSHPSOCIALHISTORY_GEN_ALL_CORE
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

## 2019-01-24 NOTE — PATIENT PROFILE ADULT - NSPROGENOTHERPROVIDER_GEN_A_NUR
medical specialist/Dr Begum primary  medical specialist/Dr Begum primary dr DR Alegria psych medical specialist/Dr Begum [phone] primary dr  DR Duglas Alegria  [phone] psych

## 2019-01-24 NOTE — CONSULT NOTE ADULT - SUBJECTIVE AND OBJECTIVE BOX
Chief Complaint:  Patient is a 73y old  Female who presents with a chief complaint of rectal bleeding (24 Jan 2019 13:48)    Cellulitis  Depression  Dementia  H/O abdominal hysterectomy  No significant past surgical history     HPI:  72yo F w/ PMHx dementia, GERD (PPI), Hx RLE Cellulitis s/p ABx (9/2/18) presents w/ BRBPR since last night. Pt's  at bedside states pt had one loose bowel movement w/ quarter size amount of blood on toilet paper when he helped her wipe.  He denies previous episodes of this nature, no Hx of Hemorrhoids no sick contacts at home. Her last EGD/C-scope was many years ago ( does not know exact date).  does not believe pt has had fever, chills or abdominal pain. Unable to obtain further Hx of present illness due to pt's baseline mental status. Not on anticoagulation at home. Last PO Intake 1/24/19 @ 1430 when  gave pt applesauce w/ some Xanax.    In ED VS:  T36.3   P103   RR18   /55  H&H 14.2/42.5   VBG pH7.55  CO2 27  O2 65 Sat 95  1L NS x1, Alprazolam x1 (given by ) (24 Jan 2019 13:48)      No Known Allergies      acetaminophen   Tablet .. 650 milliGRAM(s) Oral every 6 hours PRN  ALPRAZolam 0.25 milliGRAM(s) Oral three times a day PRN  mirtazapine 30 milliGRAM(s) Oral at bedtime  pantoprazole    Tablet 40 milliGRAM(s) Oral before breakfast  sodium chloride 0.9%. 1000 milliLiter(s) IV Continuous <Continuous>        FAMILY HISTORY:  Family history of dementia (Sibling)        Review of Systems:    General:  No wt loss, fevers, chills, night sweats,fatigue,   Eyes:  Good vision, no reported pain  ENT:  No sore throat, pain, runny nose, dysphagia  CV:  No pain, palpitatioins, hypo/hypertension  Resp:  No dyspnea, cough, tachypnea, wheezing  :  No pain, bleeding, incontinence, nocturia  Muscle:  No pain, weakness  Neuro:  No weakness, tingling, memory problems  Psych:  No fatigue, insomnia, mood problems, depression  Endocrine:  No polyuria, polydypsia, cold/heat intolerance  Heme:  No petechiae, ecchymosis, easy bruisability  Skin:  No rash, tattoos, scars, edema    Relevant Family History:       Relevant Social History:       Physical Exam:    Vital Signs:  Vital Signs Last 24 Hrs  T(C): 36.3 (24 Jan 2019 12:10), Max: 36.3 (24 Jan 2019 12:10)  T(F): 97.4 (24 Jan 2019 12:10), Max: 97.4 (24 Jan 2019 12:10)  HR: 98 (24 Jan 2019 14:40) (98 - 103)  BP: 133/84 (24 Jan 2019 14:40) (133/84 - 155/55)  BP(mean): --  RR: 19 (24 Jan 2019 14:40) (18 - 19)  SpO2: 95% (24 Jan 2019 14:40) (95% - 95%)  Daily Height in cm: 157.48 (24 Jan 2019 12:10)    Daily     General:  Appears stated age, well-groomed, well-nourished, no distress  HEENT:  NC/AT,  conjunctivae clear and pink, no thyromegaly, nodules, adenopathy, no JVD  Chest:  Full & symmetric excursion, no increased effort, breath sounds clear  Cardiovascular:  Regular rhythm, S1, S2, no murmur/rub/S3/S4, no abdominal bruit, no edema  Abdomen:  Soft, non-tender, non-distended, normoactive bowel sounds,  no masses ,no hepatosplenomeagaly, no signs of chronic liver disease  Extremities:  no cyanosis,clubbing or edema  Skin:  No rash/erythema/ecchymoses/petechiae/wounds/abscess/warm/dry  Neuro/Psych:  Alert, oriented, no asterixis, no tremor, no encephalopathy    Laboratory:                            14.2   9.73  )-----------( 249      ( 24 Jan 2019 13:12 )             42.5     01-24    140  |  106  |  19  ----------------------------<  132<H>  3.8   |  27  |  0.80    Ca    9.3      24 Jan 2019 13:12    TPro  7.3  /  Alb  3.6  /  TBili  0.7  /  DBili  x   /  AST  18  /  ALT  22  /  AlkPhos  73  01-24    LIVER FUNCTIONS - ( 24 Jan 2019 13:12 )  Alb: 3.6 g/dL / Pro: 7.3 g/dL / ALK PHOS: 73 U/L / ALT: 22 U/L / AST: 18 U/L / GGT: x           PT/INR - ( 24 Jan 2019 13:12 )   PT: 11.9 sec;   INR: 1.04 ratio         PTT - ( 24 Jan 2019 13:12 )  PTT:31.2 sec      Imaging:

## 2019-01-24 NOTE — H&P ADULT - PROBLEM SELECTOR PLAN 4
DVT PPx:  chemical Ac contraindicated due to possible GIB, ordered SCDs  FU HCV Screening  FU UA (ordered in ED) DVT PPx: chemical Ac contraindicated due to possible GIB, ordered SCDs  FU HCV Screening  FU UA (ordered in ED)

## 2019-01-24 NOTE — ED PROVIDER NOTE - OBJECTIVE STATEMENT
74 y/o female hx of dementia and GERD on PPI who presents to the ED with her spouse for rectal bleeding that began last night. her  states it has been fairly large volume but only notices it when she has BM. blood is bright red. colonoscopy and endoscopy many years ago, unsure of year. never had rectal bleed. patient with usual behavior and health status. spouse assists with hx as patient with significant dementia and unable to contribute to the hx

## 2019-01-24 NOTE — ED PROVIDER NOTE - ATTENDING CONTRIBUTION TO CARE
I have personally performed a face to face diagnostic evaluation on this patient.  I have reviewed the PA note and agree with the history, exam, and plan of care, except as noted.  History and Exam by me shows bright red blood per rectum with bowel movements since last night, demetia, poor historian, patient awake, confused, at her mental baseline, rectal exam as per PA, heart and lungs clear, abdomen soft, f/u labs, ekg, call GI.

## 2019-01-24 NOTE — H&P ADULT - NSHPREVIEWOFSYSTEMS_GEN_ALL_CORE
CONSTITUTIONAL: No fever, weight loss, or fatigue  EYES: No eye pain, visual disturbances, or discharge  ENMT:  No difficulty hearing, tinnitus, vertigo; No sinus or throat pain  NECK: No pain or stiffness  BREASTS: No pain, masses, or nipple discharge  RESPIRATORY: No cough, wheezing, chills or hemoptysis; No shortness of breath  CARDIOVASCULAR: No chest pain, palpitations, dizziness, or leg swelling  GASTROINTESTINAL: No abdominal or epigastric pain. No nausea, vomiting, or hematemesis; No diarrhea or constipation. No melena or hematochezia.  GENITOURINARY: No dysuria, frequency, hematuria, or incontinence  NEUROLOGICAL: No headaches, memory loss, loss of strength, numbness, or tremors  SKIN: No itching, burning, rashes, or lesions   LYMPH NODES: No enlarged glands  ENDOCRINE: No heat or cold intolerance; No hair loss  MUSCULOSKELETAL: No joint pain or swelling; No muscle, back, or extremity pain  PSYCHIATRIC: No depression, anxiety, mood swings, or difficulty sleeping  HEME/LYMPH: No easy bruising, or bleeding gums  ALLERY AND IMMUNOLOGIC: No hives or eczema Due to altered mental status subjective information were not able to be obtained from the patient. History and ROS were obtained, to the extent possible, from review of the chart and collateral sources of information.

## 2019-01-24 NOTE — ED ADULT NURSE NOTE - NSIMPLEMENTINTERV_GEN_ALL_ED
Implemented All Universal Safety Interventions:  [place] to call system. Call bell, personal items and telephone within reach. Instruct patient to call for assistance. Room bathroom lighting operational. Non-slip footwear when patient is off stretcher. Physically safe environment: no spills, clutter or unnecessary equipment. Stretcher in lowest position, wheels locked, appropriate side rails in place.

## 2019-01-25 ENCOUNTER — TRANSCRIPTION ENCOUNTER (OUTPATIENT)
Age: 74
End: 2019-01-25

## 2019-01-25 VITALS
SYSTOLIC BLOOD PRESSURE: 102 MMHG | DIASTOLIC BLOOD PRESSURE: 55 MMHG | HEART RATE: 78 BPM | TEMPERATURE: 98 F | OXYGEN SATURATION: 93 % | RESPIRATION RATE: 16 BRPM

## 2019-01-25 PROBLEM — L03.90 CELLULITIS, UNSPECIFIED: Chronic | Status: INACTIVE | Noted: 2019-01-24 | Resolved: 2019-01-24

## 2019-01-25 LAB
ALBUMIN SERPL ELPH-MCNC: 2.8 G/DL — LOW (ref 3.3–5)
ALP SERPL-CCNC: 61 U/L — SIGNIFICANT CHANGE UP (ref 40–120)
ALT FLD-CCNC: 16 U/L — SIGNIFICANT CHANGE UP (ref 12–78)
ANION GAP SERPL CALC-SCNC: 7 MMOL/L — SIGNIFICANT CHANGE UP (ref 5–17)
APPEARANCE UR: ABNORMAL
AST SERPL-CCNC: 17 U/L — SIGNIFICANT CHANGE UP (ref 15–37)
BASOPHILS # BLD AUTO: 0.03 K/UL — SIGNIFICANT CHANGE UP (ref 0–0.2)
BASOPHILS NFR BLD AUTO: 0.3 % — SIGNIFICANT CHANGE UP (ref 0–2)
BILIRUB DIRECT SERPL-MCNC: 0.2 MG/DL — SIGNIFICANT CHANGE UP (ref 0.05–0.2)
BILIRUB INDIRECT FLD-MCNC: 0.6 MG/DL — SIGNIFICANT CHANGE UP (ref 0.2–1)
BILIRUB SERPL-MCNC: 0.8 MG/DL — SIGNIFICANT CHANGE UP (ref 0.2–1.2)
BILIRUB UR-MCNC: NEGATIVE — SIGNIFICANT CHANGE UP
BUN SERPL-MCNC: 12 MG/DL — SIGNIFICANT CHANGE UP (ref 7–23)
CALCIUM SERPL-MCNC: 7.8 MG/DL — LOW (ref 8.5–10.1)
CHLORIDE SERPL-SCNC: 110 MMOL/L — HIGH (ref 96–108)
CO2 SERPL-SCNC: 27 MMOL/L — SIGNIFICANT CHANGE UP (ref 22–31)
COLOR SPEC: YELLOW — SIGNIFICANT CHANGE UP
CREAT SERPL-MCNC: 0.59 MG/DL — SIGNIFICANT CHANGE UP (ref 0.5–1.3)
DIFF PNL FLD: ABNORMAL
EOSINOPHIL # BLD AUTO: 0.09 K/UL — SIGNIFICANT CHANGE UP (ref 0–0.5)
EOSINOPHIL NFR BLD AUTO: 1 % — SIGNIFICANT CHANGE UP (ref 0–6)
GLUCOSE SERPL-MCNC: 103 MG/DL — HIGH (ref 70–99)
GLUCOSE UR QL: NEGATIVE — SIGNIFICANT CHANGE UP
HCT VFR BLD CALC: 36.5 % — SIGNIFICANT CHANGE UP (ref 34.5–45)
HCT VFR BLD CALC: 37.8 % — SIGNIFICANT CHANGE UP (ref 34.5–45)
HCT VFR BLD CALC: 38.4 % — SIGNIFICANT CHANGE UP (ref 34.5–45)
HCT VFR BLD CALC: 38.6 % — SIGNIFICANT CHANGE UP (ref 34.5–45)
HGB BLD-MCNC: 12.1 G/DL — SIGNIFICANT CHANGE UP (ref 11.5–15.5)
HGB BLD-MCNC: 12.4 G/DL — SIGNIFICANT CHANGE UP (ref 11.5–15.5)
HGB BLD-MCNC: 12.6 G/DL — SIGNIFICANT CHANGE UP (ref 11.5–15.5)
HGB BLD-MCNC: 12.9 G/DL — SIGNIFICANT CHANGE UP (ref 11.5–15.5)
IMM GRANULOCYTES NFR BLD AUTO: 0.3 % — SIGNIFICANT CHANGE UP (ref 0–1.5)
INR BLD: 1.16 RATIO — SIGNIFICANT CHANGE UP (ref 0.88–1.16)
KETONES UR-MCNC: ABNORMAL
LEUKOCYTE ESTERASE UR-ACNC: ABNORMAL
LYMPHOCYTES # BLD AUTO: 1.74 K/UL — SIGNIFICANT CHANGE UP (ref 1–3.3)
LYMPHOCYTES # BLD AUTO: 18.9 % — SIGNIFICANT CHANGE UP (ref 13–44)
MAGNESIUM SERPL-MCNC: 2.3 MG/DL — SIGNIFICANT CHANGE UP (ref 1.6–2.6)
MCHC RBC-ENTMCNC: 30 PG — SIGNIFICANT CHANGE UP (ref 27–34)
MCHC RBC-ENTMCNC: 33.2 GM/DL — SIGNIFICANT CHANGE UP (ref 32–36)
MCV RBC AUTO: 90.6 FL — SIGNIFICANT CHANGE UP (ref 80–100)
MONOCYTES # BLD AUTO: 0.45 K/UL — SIGNIFICANT CHANGE UP (ref 0–0.9)
MONOCYTES NFR BLD AUTO: 4.9 % — SIGNIFICANT CHANGE UP (ref 2–14)
NEUTROPHILS # BLD AUTO: 6.85 K/UL — SIGNIFICANT CHANGE UP (ref 1.8–7.4)
NEUTROPHILS NFR BLD AUTO: 74.6 % — SIGNIFICANT CHANGE UP (ref 43–77)
NITRITE UR-MCNC: NEGATIVE — SIGNIFICANT CHANGE UP
PH UR: 6 — SIGNIFICANT CHANGE UP (ref 5–8)
PLATELET # BLD AUTO: 216 K/UL — SIGNIFICANT CHANGE UP (ref 150–400)
POTASSIUM SERPL-MCNC: 3.6 MMOL/L — SIGNIFICANT CHANGE UP (ref 3.5–5.3)
POTASSIUM SERPL-SCNC: 3.6 MMOL/L — SIGNIFICANT CHANGE UP (ref 3.5–5.3)
PROT SERPL-MCNC: 5.9 G/DL — LOW (ref 6–8.3)
PROT UR-MCNC: 25 MG/DL
PROTHROM AB SERPL-ACNC: 13.2 SEC — HIGH (ref 10–12.9)
RBC # BLD: 4.03 M/UL — SIGNIFICANT CHANGE UP (ref 3.8–5.2)
RBC # FLD: 12.8 % — SIGNIFICANT CHANGE UP (ref 10.3–14.5)
SODIUM SERPL-SCNC: 144 MMOL/L — SIGNIFICANT CHANGE UP (ref 135–145)
SP GR SPEC: 1.02 — SIGNIFICANT CHANGE UP (ref 1.01–1.02)
UROBILINOGEN FLD QL: NEGATIVE — SIGNIFICANT CHANGE UP
WBC # BLD: 9.19 K/UL — SIGNIFICANT CHANGE UP (ref 3.8–10.5)
WBC # FLD AUTO: 9.19 K/UL — SIGNIFICANT CHANGE UP (ref 3.8–10.5)

## 2019-01-25 PROCEDURE — 80053 COMPREHEN METABOLIC PANEL: CPT

## 2019-01-25 PROCEDURE — 86900 BLOOD TYPING SEROLOGIC ABO: CPT

## 2019-01-25 PROCEDURE — 93005 ELECTROCARDIOGRAM TRACING: CPT

## 2019-01-25 PROCEDURE — 82803 BLOOD GASES ANY COMBINATION: CPT

## 2019-01-25 PROCEDURE — 80048 BASIC METABOLIC PNL TOTAL CA: CPT

## 2019-01-25 PROCEDURE — 86901 BLOOD TYPING SEROLOGIC RH(D): CPT

## 2019-01-25 PROCEDURE — 99239 HOSP IP/OBS DSCHRG MGMT >30: CPT

## 2019-01-25 PROCEDURE — 86803 HEPATITIS C AB TEST: CPT

## 2019-01-25 PROCEDURE — 85014 HEMATOCRIT: CPT

## 2019-01-25 PROCEDURE — 85730 THROMBOPLASTIN TIME PARTIAL: CPT

## 2019-01-25 PROCEDURE — 85610 PROTHROMBIN TIME: CPT

## 2019-01-25 PROCEDURE — 71045 X-RAY EXAM CHEST 1 VIEW: CPT

## 2019-01-25 PROCEDURE — 99285 EMERGENCY DEPT VISIT HI MDM: CPT | Mod: 25

## 2019-01-25 PROCEDURE — 80076 HEPATIC FUNCTION PANEL: CPT

## 2019-01-25 PROCEDURE — 36415 COLL VENOUS BLD VENIPUNCTURE: CPT

## 2019-01-25 PROCEDURE — 85027 COMPLETE CBC AUTOMATED: CPT

## 2019-01-25 PROCEDURE — 83735 ASSAY OF MAGNESIUM: CPT

## 2019-01-25 PROCEDURE — 81001 URINALYSIS AUTO W/SCOPE: CPT

## 2019-01-25 PROCEDURE — 86850 RBC ANTIBODY SCREEN: CPT

## 2019-01-25 PROCEDURE — 85018 HEMOGLOBIN: CPT

## 2019-01-25 RX ORDER — DOCUSATE SODIUM 100 MG
1 CAPSULE ORAL
Qty: 60 | Refills: 0
Start: 2019-01-25 | End: 2019-02-23

## 2019-01-25 RX ORDER — SENNA PLUS 8.6 MG/1
2 TABLET ORAL
Qty: 30 | Refills: 0
Start: 2019-01-25 | End: 2019-02-08

## 2019-01-25 RX ADMIN — PANTOPRAZOLE SODIUM 40 MILLIGRAM(S): 20 TABLET, DELAYED RELEASE ORAL at 05:09

## 2019-01-25 NOTE — PROGRESS NOTE ADULT - PROBLEM SELECTOR PLAN 4
DVT PPx: chemical Ac contraindicated due to possible GIB, ordered SCDs  FU HCV Screening  FU UA (ordered in ED)

## 2019-01-25 NOTE — PROGRESS NOTE ADULT - ASSESSMENT
74yo F w/ PMHx dementia, GERD (PPI), Hx RLE Cellulitis s/p ABx (9/2/18) presents w/ BRBPR since last night, now being admitted to medicine for further workup and evaluation of GIB.

## 2019-01-25 NOTE — PROGRESS NOTE ADULT - PROBLEM SELECTOR PLAN 1
Hemodynamically Stable, Hgb stable overnight with no episodes of BRBPR noted  will discuss with GI further need for C-scope/EGD   NPO w/ IVF (NS 75cc/hr) for now  GI consulted  FU Hepatic Panel  Type and Screen   Continue Pantoprazole 40mg (home med)  Tylenol for mild pain/Temp PRN q6hr  Continue to hold AC at this time

## 2019-01-25 NOTE — DISCHARGE NOTE ADULT - HOSPITAL COURSE
The patient is a 73 year old F w/ PMH dementia and depression presented to [facility] ED complaining of bright red blood per rectum. Pt was subsequently admitted for further workup and evaluation of possible GI bleed. Routine consultations were obtained from Gastroenterology. Anticoagulation was held and hemoglobin and hematocrit were trended. All home medications were continued. The rest of the hospital stay was unremarkable.    Patient improved clinically throughout hospital course. Patient seen and examined on day of discharge.    Vital Signs Last 24 Hrs    Physical Exam:    Patient is medically stable for discharge to ____ with outpatient follow up. The patient is a 73 year old F w/ PMH dementia and depression presented to [facility] ED with family complaining of bright red blood per rectum. Pt was subsequently admitted for further workup and evaluation of possible GI bleed (size of a quarter as per family). Routine consultations were obtained from Gastroenterology ([facility]). Anticoagulation was held and hemoglobin and hematocrit were trended. All home medications were continued. Pt was started on IVF. Pt was stable hemodynamically and Hgb stabilized at ~12.5. No bleeding as inpatient. GI rec outpatient f/up. Pt at baseline. Discharged to home.     On day of discharge:  ROS: pt with advanced dementia and cannot provide ROS.    Vital Signs Last 24 Hrs  T(C): 36.4 (25 Jan 2019 14:09), Max: 36.8 (24 Jan 2019 20:19)  T(F): 97.6 (25 Jan 2019 14:09), Max: 98.3 (24 Jan 2019 20:19)  HR: 78 (25 Jan 2019 14:09) (75 - 85)  BP: 102/55 (25 Jan 2019 14:09) (102/55 - 146/76)  BP(mean): --  RR: 16 (25 Jan 2019 14:09) (16 - 18)  SpO2: 93% (25 Jan 2019 14:09) (93% - 99%)    Physical Exam:  Gen: NAD, confused, rapidly talking and clapping  HEENT: mmm, anicteric  CV: rrr, S1, S2  Chest: CTA b/l  Abd: BS+, soft, NT, ND  Extr: no edema or cyanosis  Neuro: AA&Ox0 (baseline as per family)    Time spent: 40min

## 2019-01-25 NOTE — DISCHARGE NOTE ADULT - MEDICATION SUMMARY - MEDICATIONS TO TAKE
I will START or STAY ON the medications listed below when I get home from the hospital:    mirtazapine 30 mg oral tablet  -- 1 tab(s) by mouth once a day (at bedtime)  -- Indication: For Dementia    ALPRAZolam 0.25 mg oral tablet  -- 1 tab(s) by mouth 3 times a day, As Needed for anxiety  -- Indication: For Dementia / anxiety    senna oral tablet  -- 2 tab(s) by mouth once a day (at bedtime), As Needed for constipation  -- Indication: For constipation    Colace 100 mg oral capsule  -- 1 cap(s) by mouth 2 times a day   -- Medication should be taken with plenty of water.    -- Indication: For constipation    Metamucil 3.4 g/3.7 g oral powder for reconstitution  -- 1 dose(s) by mouth once a day, As Needed for constipation  -- Indication: For constipation    omeprazole 40 mg oral delayed release capsule  -- 1 cap(s) by mouth once a day  -- Indication: For Decrease stomach acid

## 2019-01-25 NOTE — DISCHARGE NOTE ADULT - CARE PLAN
Principal Discharge DX:	GIB (gastrointestinal bleeding)  Goal:	Resolution of symptoms  Assessment and plan of treatment:	continue taking home dose of omeprazole (see med rec for details)   Please followup with your gastroenterologist as outpatient for further workup.  Secondary Diagnosis:	Dementia  Assessment and plan of treatment:	Chronic, stable  continue taking your home dose of alprazolam (see med rec for details)  Secondary Diagnosis:	Depression  Assessment and plan of treatment:	Chronic, stable  continue taking your home dose of mirtazepine (see med rec for details) Principal Discharge DX:	GIB (gastrointestinal bleeding)  Goal:	Resolution of symptoms  Assessment and plan of treatment:	You have no signs of bleeding in the hospital. Your blood counts are stable and your hemoglobin is ~12.5 which is normal. No anemia currently.   Take the stool softener colace as prescribed and you may take the prescribed senna if you need it to prevent constipation. Aim to have a bowel movement per day as chronic constipation can increase your chance of having gastrointestinal bleeding.  Continue taking home dose of omeprazole.  Please followup with gastroenterologist, Dr. Jean, (number below) as outpatient in 2-3 weeks. If you have bleeding again, call the gastroenterologist.  Secondary Diagnosis:	Dementia  Assessment and plan of treatment:	Continue taking your home alprazolam and mirtazepine and follow up with your PMD.  Secondary Diagnosis:	Depression  Assessment and plan of treatment:	continue taking your home dose of mirtazepine and follow up with your PMD.

## 2019-01-25 NOTE — PROGRESS NOTE ADULT - SUBJECTIVE AND OBJECTIVE BOX
Patient is a 73y old  Female who presents with a chief complaint of rectal bleeding (24 Jan 2019 16:47)      INTERVAL HPI/OVERNIGHT EVENTS: No acute events noted overnight per chart or nursing. Unable to determine if pt has concerns or questions due to baseline mental status.    MEDICATIONS  (STANDING):  mirtazapine 30 milliGRAM(s) Oral at bedtime  pantoprazole    Tablet 40 milliGRAM(s) Oral before breakfast  sodium chloride 0.9%. 1000 milliLiter(s) (75 mL/Hr) IV Continuous <Continuous>    MEDICATIONS  (PRN):  acetaminophen   Tablet .. 650 milliGRAM(s) Oral every 6 hours PRN Temp greater or equal to 38C (100.4F), Mild Pain (1 - 3)  ALPRAZolam 0.25 milliGRAM(s) Oral three times a day PRN agitation      Allergies    No Known Allergies    Intolerances        REVIEW OF SYSTEMS: unable to assess due to baseline mental status    Vital Signs Last 24 Hrs  T(C): 36.8 (25 Jan 2019 05:26), Max: 36.8 (24 Jan 2019 17:08)  T(F): 98.3 (25 Jan 2019 05:26), Max: 98.3 (24 Jan 2019 17:08)  HR: 75 (25 Jan 2019 05:26) (75 - 103)  BP: 118/70 (25 Jan 2019 05:26) (118/70 - 155/87)  BP(mean): --  RR: 18 (25 Jan 2019 05:26) (18 - 19)  SpO2: 99% (25 Jan 2019 05:26) (95% - 99%)    PHYSICAL EXAM:  GENERAL: NAD, laying in bed clapping her hands blurting intermittent Sinhala phrases (baseline per )  HEAD:  Atraumatic, Normocephalic  EYES: EOMI, PERRLA, conjunctiva and sclera clear  ENMT: Moist mucous membranes  NERVOUS SYSTEM:  Alert, does not follow commands or interact during exam (at baseline per ), poor concentration, unable to assess specific motor/sensory function   CHEST/LUNG: Clear to percussion bilaterally; equal rise of chest bilaterally  HEART: Regular rate and rhythm; S1/S2, unable to assess additional heart sounds m/r/g due to pt clapping and talking  ABDOMEN: Soft, Nontender, Nondistended; Bowel sounds present, does not withdraw in pain w/ light or deep palpation  EXTREMITIES:  2+ Peripheral Pulses, varicose veins BLLE, No edema    LABS:                        12.9   x     )-----------( x        ( 25 Jan 2019 02:05 )             38.6     CBC Full  -  ( 25 Jan 2019 02:05 )  WBC Count : x  Hemoglobin : 12.9 g/dL  Hematocrit : 38.6 %  Platelet Count - Automated : x  Mean Cell Volume : x  Mean Cell Hemoglobin : x  Mean Cell Hemoglobin Concentration : x  Auto Neutrophil # : x  Auto Lymphocyte # : x  Auto Monocyte # : x  Auto Eosinophil # : x  Auto Basophil # : x  Auto Neutrophil % : x  Auto Lymphocyte % : x  Auto Monocyte % : x  Auto Eosinophil % : x  Auto Basophil % : x    24 Jan 2019 13:12    140    |  106    |  19     ----------------------------<  132    3.8     |  27     |  0.80     Ca    9.3        24 Jan 2019 13:12    TPro  7.3    /  Alb  3.6    /  TBili  0.7    /  DBili  x      /  AST  18     /  ALT  22     /  AlkPhos  73     24 Jan 2019 13:12    PT/INR - ( 24 Jan 2019 13:12 )   PT: 11.9 sec;   INR: 1.04 ratio         PTT - ( 24 Jan 2019 13:12 )  PTT:31.2 sec    CAPILLARY BLOOD GLUCOSE              RADIOLOGY & ADDITIONAL TESTS: N/A    Personally reviewed.     Consultant(s) Notes Reviewed:  [x] YES  [ ] NO    Care Discussed with [x] Consultants  [x] Patient  [ ] Family  [ ]      [ ] Other; RN  DVT ppx
INTERVAL HPI/OVERNIGHT EVENTS:  pt seen and examined  confused and restless in bed  per overnight rn no s/s active gib no bms  afebrile overnight labs noted    MEDICATIONS  (STANDING):  mirtazapine 30 milliGRAM(s) Oral at bedtime  pantoprazole    Tablet 40 milliGRAM(s) Oral before breakfast    MEDICATIONS  (PRN):  acetaminophen   Tablet .. 650 milliGRAM(s) Oral every 6 hours PRN Temp greater or equal to 38C (100.4F), Mild Pain (1 - 3)  ALPRAZolam 0.25 milliGRAM(s) Oral three times a day PRN agitation      Allergies    No Known Allergies    Intolerances        Review of Systems:    unable to obtain      Vital Signs Last 24 Hrs  T(C): 36.8 (25 Jan 2019 05:26), Max: 36.8 (24 Jan 2019 17:08)  T(F): 98.3 (25 Jan 2019 05:26), Max: 98.3 (24 Jan 2019 17:08)  HR: 75 (25 Jan 2019 05:26) (75 - 103)  BP: 118/70 (25 Jan 2019 05:26) (118/70 - 155/87)  BP(mean): --  RR: 18 (25 Jan 2019 05:26) (18 - 19)  SpO2: 99% (25 Jan 2019 05:26) (95% - 99%)    PHYSICAL EXAM:    Constitutional: lying in bed, restless  HEENT: ncat  Neck: No LAD  Respiratory: dec bs  Cardiovascular: S1 and S2, RRR  Gastrointestinal: soft appears nt nd  Extremities: No peripheral edema  Vascular: 2+ peripheral pulses  Neurological: Awake but confused  Skin: No rashes      LABS:                        12.1   9.19  )-----------( 216      ( 25 Jan 2019 08:54 )             36.5     01-25    144  |  110<H>  |  12  ----------------------------<  103<H>  3.6   |  27  |  0.59    Ca    7.8<L>      25 Jan 2019 08:54  Mg     2.3     01-25    TPro  5.9<L>  /  Alb  2.8<L>  /  TBili  0.8  /  DBili  .20  /  AST  17  /  ALT  16  /  AlkPhos  61  01-25    PT/INR - ( 25 Jan 2019 08:54 )   PT: 13.2 sec;   INR: 1.16 ratio         PTT - ( 24 Jan 2019 13:12 )  PTT:31.2 sec      RADIOLOGY & ADDITIONAL TESTS:

## 2019-01-25 NOTE — PROGRESS NOTE ADULT - PROBLEM SELECTOR PLAN 1
no further episodes  CBC stable sp IVF  trend h/h, transfuse prn  cont ppi  monitor for further bleeding  conservative management for now  diet as tolerated  will follow

## 2019-01-25 NOTE — DISCHARGE NOTE ADULT - CARE PROVIDERS DIRECT ADDRESSES
,DirectAddress_Unknown ,DirectAddress_Unknown,[email or web address]

## 2019-01-25 NOTE — DISCHARGE NOTE ADULT - CARE PROVIDER_API CALL
Ashok Begum (MD), Internal Medicine  [address] [place] [address]  [address] [place] [address]  Phone: [phone]  Fax: [phone] Ashok Begum), Internal Medicine  [address] [facility]  [address]rd [place] [address]  Phone: [phone]  Fax: [phone]    Sd Jean), Gastroenterology  [address] [place] [address]  Phone: [phone]  Fax: [phone]

## 2019-01-25 NOTE — DISCHARGE NOTE ADULT - PLAN OF CARE
Resolution of symptoms continue taking home dose of omeprazole (see med rec for details)   Please followup with your gastroenterologist as outpatient for further workup. Chronic, stable  continue taking your home dose of alprazolam (see med rec for details) Chronic, stable  continue taking your home dose of mirtazepine (see med rec for details) You have no signs of bleeding in the hospital. Your blood counts are stable and your hemoglobin is ~12.5 which is normal. No anemia currently.   Take the stool softener colace as prescribed and you may take the prescribed senna if you need it to prevent constipation. Aim to have a bowel movement per day as chronic constipation can increase your chance of having gastrointestinal bleeding.  Continue taking home dose of omeprazole.  Please followup with gastroenterologist, Dr. Jean, (number below) as outpatient in 2-3 weeks. If you have bleeding again, call the gastroenterologist. Continue taking your home alprazolam and mirtazepine and follow up with your PMD. continue taking your home dose of mirtazepine and follow up with your PMD.

## 2019-01-25 NOTE — DISCHARGE NOTE ADULT - PATIENT PORTAL LINK FT
97 You can access the [facility] Patient Portal, offered by [facility], by registering with the following website: [email or web address]

## 2019-01-26 LAB
HCV AB S/CO SERPL IA: 0.39 S/CO — SIGNIFICANT CHANGE UP
HCV AB SERPL-IMP: SIGNIFICANT CHANGE UP

## 2019-02-19 ENCOUNTER — APPOINTMENT (OUTPATIENT)
Dept: PSYCHIATRY | Facility: CLINIC | Age: 74
End: 2019-02-19

## 2019-03-15 ENCOUNTER — APPOINTMENT (OUTPATIENT)
Dept: PSYCHIATRY | Facility: CLINIC | Age: 74
End: 2019-03-15
Payer: MEDICARE

## 2019-03-15 PROCEDURE — 99214 OFFICE O/P EST MOD 30 MIN: CPT

## 2019-05-10 ENCOUNTER — APPOINTMENT (OUTPATIENT)
Dept: PSYCHIATRY | Facility: CLINIC | Age: 74
End: 2019-05-10
Payer: MEDICARE

## 2019-05-10 PROCEDURE — 99214 OFFICE O/P EST MOD 30 MIN: CPT

## 2019-08-08 ENCOUNTER — APPOINTMENT (OUTPATIENT)
Dept: PSYCHIATRY | Facility: CLINIC | Age: 74
End: 2019-08-08
Payer: MEDICARE

## 2019-08-08 PROCEDURE — 99214 OFFICE O/P EST MOD 30 MIN: CPT

## 2019-11-07 ENCOUNTER — APPOINTMENT (OUTPATIENT)
Dept: PSYCHIATRY | Facility: CLINIC | Age: 74
End: 2019-11-07
Payer: MEDICARE

## 2019-11-07 PROCEDURE — 99214 OFFICE O/P EST MOD 30 MIN: CPT

## 2019-11-12 NOTE — DISCHARGE NOTE ADULT - HOSPITAL COURSE
Present, unchanged
71 YO F PMH dementia and depression presenting with RLE redness and swelling admitted for Right foot cellulitis. Pt received antibiotics and was seen by ID who recommended po keflex for 7 days as an outpt. Pt showed clinical improvement and was seen by PT who determined pt  had no PT needs at this time and family will continue to support patient's activity and further management at home. Pt is stable and improved for discharge.      Time spent: 40 minutes

## 2019-12-20 NOTE — ED PROVIDER NOTE - RESPIRATORY, MLM
Patient/Caregiver provided printed discharge information. Breath sounds clear and equal bilaterally.

## 2020-05-07 ENCOUNTER — APPOINTMENT (OUTPATIENT)
Dept: PSYCHIATRY | Facility: CLINIC | Age: 75
End: 2020-05-07

## 2020-06-10 ENCOUNTER — APPOINTMENT (OUTPATIENT)
Dept: PSYCHIATRY | Facility: CLINIC | Age: 75
End: 2020-06-10
Payer: MEDICARE

## 2020-06-10 PROCEDURE — 99214 OFFICE O/P EST MOD 30 MIN: CPT

## 2020-07-10 ENCOUNTER — INPATIENT (INPATIENT)
Facility: HOSPITAL | Age: 75
LOS: 3 days | Discharge: HOME | DRG: 872 | End: 2020-07-14
Attending: STUDENT IN AN ORGANIZED HEALTH CARE EDUCATION/TRAINING PROGRAM | Admitting: STUDENT IN AN ORGANIZED HEALTH CARE EDUCATION/TRAINING PROGRAM
Payer: MEDICARE

## 2020-07-10 VITALS
RESPIRATION RATE: 18 BRPM | SYSTOLIC BLOOD PRESSURE: 152 MMHG | DIASTOLIC BLOOD PRESSURE: 78 MMHG | HEART RATE: 111 BPM | OXYGEN SATURATION: 95 % | WEIGHT: 110.89 LBS | HEIGHT: 62 IN | TEMPERATURE: 101 F
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
## 2020-07-10 DIAGNOSIS — Z90.710 ACQUIRED ABSENCE OF BOTH CERVIX AND UTERUS: Chronic | ICD-10-CM

## 2020-07-10 DIAGNOSIS — N39.0 URINARY TRACT INFECTION, SITE NOT SPECIFIED: ICD-10-CM

## 2020-07-10 DIAGNOSIS — F03.90 UNSPECIFIED DEMENTIA, UNSPECIFIED SEVERITY, WITHOUT BEHAVIORAL DISTURBANCE, PSYCHOTIC DISTURBANCE, MOOD DISTURBANCE, AND ANXIETY: ICD-10-CM

## 2020-07-10 DIAGNOSIS — S42.009A FRACTURE OF UNSPECIFIED PART OF UNSPECIFIED CLAVICLE, INITIAL ENCOUNTER FOR CLOSED FRACTURE: ICD-10-CM

## 2020-07-10 DIAGNOSIS — K21.9 GASTRO-ESOPHAGEAL REFLUX DISEASE WITHOUT ESOPHAGITIS: ICD-10-CM

## 2020-07-10 DIAGNOSIS — Z29.9 ENCOUNTER FOR PROPHYLACTIC MEASURES, UNSPECIFIED: ICD-10-CM

## 2020-07-10 DIAGNOSIS — F32.9 MAJOR DEPRESSIVE DISORDER, SINGLE EPISODE, UNSPECIFIED: ICD-10-CM

## 2020-07-10 LAB
ALBUMIN SERPL ELPH-MCNC: 3.5 G/DL — SIGNIFICANT CHANGE UP (ref 3.3–5)
ALP SERPL-CCNC: 60 U/L — SIGNIFICANT CHANGE UP (ref 40–120)
ALT FLD-CCNC: 22 U/L — SIGNIFICANT CHANGE UP (ref 12–78)
ANION GAP SERPL CALC-SCNC: 4 MMOL/L — LOW (ref 5–17)
APPEARANCE UR: ABNORMAL
APTT BLD: 31.1 SEC — SIGNIFICANT CHANGE UP (ref 27.5–35.5)
AST SERPL-CCNC: 20 U/L — SIGNIFICANT CHANGE UP (ref 15–37)
BASOPHILS # BLD AUTO: 0.03 K/UL — SIGNIFICANT CHANGE UP (ref 0–0.2)
BASOPHILS NFR BLD AUTO: 0.3 % — SIGNIFICANT CHANGE UP (ref 0–2)
BILIRUB SERPL-MCNC: 0.4 MG/DL — SIGNIFICANT CHANGE UP (ref 0.2–1.2)
BILIRUB UR-MCNC: NEGATIVE — SIGNIFICANT CHANGE UP
BUN SERPL-MCNC: 17 MG/DL — SIGNIFICANT CHANGE UP (ref 7–23)
CALCIUM SERPL-MCNC: 9.3 MG/DL — SIGNIFICANT CHANGE UP (ref 8.5–10.1)
CHLORIDE SERPL-SCNC: 108 MMOL/L — SIGNIFICANT CHANGE UP (ref 96–108)
CO2 SERPL-SCNC: 30 MMOL/L — SIGNIFICANT CHANGE UP (ref 22–31)
COLOR SPEC: YELLOW — SIGNIFICANT CHANGE UP
CREAT SERPL-MCNC: 0.92 MG/DL — SIGNIFICANT CHANGE UP (ref 0.5–1.3)
DIFF PNL FLD: ABNORMAL
EOSINOPHIL # BLD AUTO: 0.03 K/UL — SIGNIFICANT CHANGE UP (ref 0–0.5)
EOSINOPHIL NFR BLD AUTO: 0.3 % — SIGNIFICANT CHANGE UP (ref 0–6)
GLUCOSE SERPL-MCNC: 140 MG/DL — HIGH (ref 70–99)
GLUCOSE UR QL: NEGATIVE — SIGNIFICANT CHANGE UP
HCT VFR BLD CALC: 41.2 % — SIGNIFICANT CHANGE UP (ref 34.5–45)
HGB BLD-MCNC: 13.3 G/DL — SIGNIFICANT CHANGE UP (ref 11.5–15.5)
IMM GRANULOCYTES NFR BLD AUTO: 0.6 % — SIGNIFICANT CHANGE UP (ref 0–1.5)
INR BLD: 1.05 RATIO — SIGNIFICANT CHANGE UP (ref 0.88–1.16)
KETONES UR-MCNC: ABNORMAL
LACTATE SERPL-SCNC: 2 MMOL/L — SIGNIFICANT CHANGE UP (ref 0.7–2)
LEUKOCYTE ESTERASE UR-ACNC: ABNORMAL
LYMPHOCYTES # BLD AUTO: 1.35 K/UL — SIGNIFICANT CHANGE UP (ref 1–3.3)
LYMPHOCYTES # BLD AUTO: 15.5 % — SIGNIFICANT CHANGE UP (ref 13–44)
MCHC RBC-ENTMCNC: 29.4 PG — SIGNIFICANT CHANGE UP (ref 27–34)
MCHC RBC-ENTMCNC: 32.3 GM/DL — SIGNIFICANT CHANGE UP (ref 32–36)
MCV RBC AUTO: 90.9 FL — SIGNIFICANT CHANGE UP (ref 80–100)
MONOCYTES # BLD AUTO: 0.66 K/UL — SIGNIFICANT CHANGE UP (ref 0–0.9)
MONOCYTES NFR BLD AUTO: 7.6 % — SIGNIFICANT CHANGE UP (ref 2–14)
NEUTROPHILS # BLD AUTO: 6.59 K/UL — SIGNIFICANT CHANGE UP (ref 1.8–7.4)
NEUTROPHILS NFR BLD AUTO: 75.7 % — SIGNIFICANT CHANGE UP (ref 43–77)
NITRITE UR-MCNC: POSITIVE
NRBC # BLD: 0 /100 WBCS — SIGNIFICANT CHANGE UP (ref 0–0)
PH UR: 6.5 — SIGNIFICANT CHANGE UP (ref 5–8)
PLATELET # BLD AUTO: 243 K/UL — SIGNIFICANT CHANGE UP (ref 150–400)
POTASSIUM SERPL-MCNC: 3.7 MMOL/L — SIGNIFICANT CHANGE UP (ref 3.5–5.3)
POTASSIUM SERPL-SCNC: 3.7 MMOL/L — SIGNIFICANT CHANGE UP (ref 3.5–5.3)
PROT SERPL-MCNC: 7.4 G/DL — SIGNIFICANT CHANGE UP (ref 6–8.3)
PROT UR-MCNC: 30 MG/DL
PROTHROM AB SERPL-ACNC: 12.3 SEC — SIGNIFICANT CHANGE UP (ref 10.6–13.6)
RBC # BLD: 4.53 M/UL — SIGNIFICANT CHANGE UP (ref 3.8–5.2)
RBC # FLD: 13.2 % — SIGNIFICANT CHANGE UP (ref 10.3–14.5)
SARS-COV-2 RNA SPEC QL NAA+PROBE: SIGNIFICANT CHANGE UP
SODIUM SERPL-SCNC: 142 MMOL/L — SIGNIFICANT CHANGE UP (ref 135–145)
SP GR SPEC: 1.02 — SIGNIFICANT CHANGE UP (ref 1.01–1.02)
UROBILINOGEN FLD QL: NEGATIVE — SIGNIFICANT CHANGE UP
WBC # BLD: 8.71 K/UL — SIGNIFICANT CHANGE UP (ref 3.8–10.5)
WBC # FLD AUTO: 8.71 K/UL — SIGNIFICANT CHANGE UP (ref 3.8–10.5)

## 2020-07-10 PROCEDURE — 71260 CT THORAX DX C+: CPT | Mod: 26

## 2020-07-10 PROCEDURE — 73000 X-RAY EXAM OF COLLAR BONE: CPT | Mod: 26,LT

## 2020-07-10 PROCEDURE — 99223 1ST HOSP IP/OBS HIGH 75: CPT | Mod: GC,AI

## 2020-07-10 PROCEDURE — 93010 ELECTROCARDIOGRAM REPORT: CPT

## 2020-07-10 PROCEDURE — 71045 X-RAY EXAM CHEST 1 VIEW: CPT | Mod: 26

## 2020-07-10 PROCEDURE — 72170 X-RAY EXAM OF PELVIS: CPT | Mod: 26

## 2020-07-10 PROCEDURE — 73030 X-RAY EXAM OF SHOULDER: CPT | Mod: 26,LT

## 2020-07-10 PROCEDURE — 99285 EMERGENCY DEPT VISIT HI MDM: CPT

## 2020-07-10 PROCEDURE — 73060 X-RAY EXAM OF HUMERUS: CPT | Mod: 26,LT

## 2020-07-10 PROCEDURE — 74177 CT ABD & PELVIS W/CONTRAST: CPT | Mod: 26

## 2020-07-10 PROCEDURE — 70450 CT HEAD/BRAIN W/O DYE: CPT | Mod: 26

## 2020-07-10 PROCEDURE — 73200 CT UPPER EXTREMITY W/O DYE: CPT | Mod: 26,LT

## 2020-07-10 PROCEDURE — 72125 CT NECK SPINE W/O DYE: CPT | Mod: 26

## 2020-07-10 RX ORDER — ALPRAZOLAM 0.25 MG
1 TABLET ORAL
Qty: 0 | Refills: 0 | DISCHARGE

## 2020-07-10 RX ORDER — OMEPRAZOLE 10 MG/1
1 CAPSULE, DELAYED RELEASE ORAL
Qty: 0 | Refills: 0 | DISCHARGE

## 2020-07-10 RX ORDER — ACETAMINOPHEN 500 MG
650 TABLET ORAL EVERY 6 HOURS
Refills: 0 | Status: DISCONTINUED | OUTPATIENT
Start: 2020-07-10 | End: 2020-07-14

## 2020-07-10 RX ORDER — ENOXAPARIN SODIUM 100 MG/ML
40 INJECTION SUBCUTANEOUS DAILY
Refills: 0 | Status: DISCONTINUED | OUTPATIENT
Start: 2020-07-10 | End: 2020-07-14

## 2020-07-10 RX ORDER — ALPRAZOLAM 0.25 MG
0.25 TABLET ORAL DAILY
Refills: 0 | Status: DISCONTINUED | OUTPATIENT
Start: 2020-07-10 | End: 2020-07-14

## 2020-07-10 RX ORDER — MIRTAZAPINE 45 MG/1
1 TABLET, ORALLY DISINTEGRATING ORAL
Qty: 0 | Refills: 0 | DISCHARGE

## 2020-07-10 RX ORDER — PSYLLIUM SEED (WITH DEXTROSE)
1 POWDER (GRAM) ORAL
Qty: 0 | Refills: 0 | DISCHARGE

## 2020-07-10 RX ORDER — MIRTAZAPINE 45 MG/1
30 TABLET, ORALLY DISINTEGRATING ORAL AT BEDTIME
Refills: 0 | Status: DISCONTINUED | OUTPATIENT
Start: 2020-07-10 | End: 2020-07-14

## 2020-07-10 RX ORDER — PANTOPRAZOLE SODIUM 20 MG/1
40 TABLET, DELAYED RELEASE ORAL
Refills: 0 | Status: DISCONTINUED | OUTPATIENT
Start: 2020-07-10 | End: 2020-07-14

## 2020-07-10 RX ORDER — ACETAMINOPHEN 500 MG
650 TABLET ORAL ONCE
Refills: 0 | Status: COMPLETED | OUTPATIENT
Start: 2020-07-10 | End: 2020-07-10

## 2020-07-10 RX ORDER — SODIUM CHLORIDE 9 MG/ML
1550 INJECTION INTRAMUSCULAR; INTRAVENOUS; SUBCUTANEOUS ONCE
Refills: 0 | Status: COMPLETED | OUTPATIENT
Start: 2020-07-10 | End: 2020-07-10

## 2020-07-10 RX ORDER — SACCHAROMYCES BOULARDII 250 MG
250 POWDER IN PACKET (EA) ORAL
Refills: 0 | Status: DISCONTINUED | OUTPATIENT
Start: 2020-07-10 | End: 2020-07-14

## 2020-07-10 RX ORDER — CEFTRIAXONE 500 MG/1
1000 INJECTION, POWDER, FOR SOLUTION INTRAMUSCULAR; INTRAVENOUS ONCE
Refills: 0 | Status: COMPLETED | OUTPATIENT
Start: 2020-07-10 | End: 2020-07-10

## 2020-07-10 RX ORDER — POLYETHYLENE GLYCOL 3350 17 G/17G
17 POWDER, FOR SOLUTION ORAL
Qty: 0 | Refills: 0 | DISCHARGE

## 2020-07-10 RX ORDER — POLYETHYLENE GLYCOL 3350 17 G/17G
17 POWDER, FOR SOLUTION ORAL DAILY
Refills: 0 | Status: DISCONTINUED | OUTPATIENT
Start: 2020-07-10 | End: 2020-07-14

## 2020-07-10 RX ORDER — CEFTRIAXONE 500 MG/1
1000 INJECTION, POWDER, FOR SOLUTION INTRAMUSCULAR; INTRAVENOUS EVERY 24 HOURS
Refills: 0 | Status: DISCONTINUED | OUTPATIENT
Start: 2020-07-11 | End: 2020-07-14

## 2020-07-10 RX ADMIN — SODIUM CHLORIDE 1550 MILLILITER(S): 9 INJECTION INTRAMUSCULAR; INTRAVENOUS; SUBCUTANEOUS at 20:17

## 2020-07-10 RX ADMIN — CEFTRIAXONE 100 MILLIGRAM(S): 500 INJECTION, POWDER, FOR SOLUTION INTRAMUSCULAR; INTRAVENOUS at 21:21

## 2020-07-10 NOTE — H&P ADULT - NSHPSOCIALHISTORY_GEN_ALL_CORE
Lives with   Denies current tobacco, alcohol and drug use Lives with   No current tobacco, alcohol and drug use

## 2020-07-10 NOTE — H&P ADULT - NSHPREVIEWOFSYSTEMS_GEN_ALL_CORE
Unable to obtain meaningful ROS due to mental status Unable to obtain meaningful ROS due to mental status  obtained hx from

## 2020-07-10 NOTE — ED PROVIDER NOTE - PHYSICAL EXAMINATION
Constitutional: Awake, Alert, non-toxic. NAD. Well appearing, well nourished.   HEAD: Normocephalic, atraumatic.   EYES: PERRL, EOM intact, conjunctiva and sclera are clear bilaterally. No raccoon eyes.   ENT: No plunkett sign. No rhinorrhea, mucous membranes pink/moist  NECK: Supple, non-tender; no cervical LAD, no JVD, no goiter.  BACK: No midline or paraspinal TTP of cervical/thoracic/lumbar spine, FROM. No ecchymosis or hematomas.   CARDIOVASCULAR: Normal S1, S2; regular rate and rhythm.  RESPIRATORY: Normal respiratory effort; breath sounds CTAB, no wheezes, rhonchi, or rales. Speaking in full sentences. No accessory muscle use.   ABDOMEN: Soft; non-tender, non-distended. Normal bowel sounds x 4. no palpable masses, no bruits, no CVA TTP. No guarding.   EXTREMITIES: Full passive and active ROM in all extremities; (+) left shoulder and clavicle TTP, (+) swelling and ecchymosis; distal pulses palpable and symmetric, no edema, no crepitus or step off, no hip TTP, no pain with passive ROM hip   SKIN: Warm, dry; good skin turgor, no apparent lesions or rashes, no ecchymosis, brisk capillary refill.  NEURO: Sensory and motor functions are grossly intact. Neurovascular sensation intact motor function 5/5 of upper and lower extremities, CN II-XII grossly intact, Eyes- PERRL bilaterally. EOMs in tact. No nystagmus. No facial droop. pt not following commands, not answering questions.

## 2020-07-10 NOTE — ED PROVIDER NOTE - CLINICAL SUMMARY MEDICAL DECISION MAKING FREE TEXT BOX
BIB  due to fall. Left shoulder injury. diagnosed with clavicle fx at urgent care. plan includes Ct head r/o CVA, Ct cervical r/o fx, xray shoulder and clavicle r/o fx

## 2020-07-10 NOTE — H&P ADULT - PROBLEM SELECTOR PLAN 1
Admit to medicine   Clavicular fx s/p fall at home  Ortho input appreciated. Not an operative candidate for clavicle fracture given age and underlying cognitive status.   Needs frequent skin checks and sling to immobilize L shoulder  Will require MRI to help further clarify and if true fracture will determine any ligamentous instability (that would require immobilization)  NWB LUE in sling  Soft collar until MRI performed. Admit to medicine   Clavicular fx s/p fall at home  Ortho input appreciated. Not an operative candidate for clavicle fracture given age and underlying cognitive status.   Needs frequent skin checks and sling to immobilize L shoulder    NWB LUE in sling  Soft collar until MRI performed.

## 2020-07-10 NOTE — H&P ADULT - PROBLEM SELECTOR PLAN 7
IMPROVE VTE Individual Risk Assessment          RISK                                                          Points  [  ] Previous VTE                                                3  [  ] Thrombophilia                                             2  [  ] Lower limb paralysis                                   2        (unable to hold up >15 seconds)    [  ] Current Cancer                                             2         (within 6 months)  [  ] Immobilization > 24 hrs                              1  [  ] ICU/CCU stay > 24 hours                             1  [x  ] Age > 60                                                         1    IMPROVE VTE Score: 0    Lovenox 40mg QD for DVT ppx   Miralax for bowel regimen

## 2020-07-10 NOTE — H&P ADULT - PROBLEM SELECTOR PLAN 4
Baseline dementia  Continue Remeron  Continue Xanax .25mg Qd Baseline dementia  Continue Remeron  Continue Xanax .25mg Qd  hx of dysphagia, S&S eval, will place on dysphagia 3 diet in interim Baseline dementia  Continue Remeron  Continue Xanax .25mg Qd (takes daily per )  hx of dysphagia, S&S eval, will place on dysphagia 3 diet in interim (diet from previous admission)

## 2020-07-10 NOTE — H&P ADULT - ASSESSMENT
73yo F w/ PMHx dementia, GERD (PPI), Hx RLE Cellulitis presenting s/p fall at home. Found to have clavicular fx. 73yo F w/ PMHx dementia, GERD (PPI), Hx RLE Cellulitis presenting s/p fall at home. Found to have clavicular fx and UTI.

## 2020-07-10 NOTE — H&P ADULT - NSHPPHYSICALEXAM_GEN_ALL_CORE
Vitals:  T(C): 36.7 (07-10-20 @ 19:45), Max: 38.1 (07-10-20 @ 17:36)  HR: 96 (07-10-20 @ 19:45) (96 - 111)  BP: 165/91 (07-10-20 @ 19:45) (152/78 - 165/91)  RR: 17 (07-10-20 @ 19:45) (17 - 18)  SpO2: 96% (07-10-20 @ 19:45) (95% - 96%)    PHYSICAL EXAM:  General: Well developed, well nourished, no apparent distress  HEENT: Normocephalic, atraumatic, PERLLA, EOMI bilateral, moist mucous membranes   Neck: Supple, nontender, no mass  Neurology: Alert and oriented x3, nonfocal, sensation intact   Respiratory: Clear to auscultation bilateral, no wheezing, rales or ronchi   Cardio: S1,S2, no murmurs, rubs or gallops  Abdominal: Soft, non-tender, non-distended bowel sounds present x4, no palpable masses  Extremities: No clubbing, cyanosis or edema, peripheral pulses present  MSK: Normal range of motion, no joint erythema or warmth, no joint swelling   Heme: No obvious ecchymosis or petechiae   Skin: warm, dry, normal color Vitals:  T(C): 36.7 (07-10-20 @ 19:45), Max: 38.1 (07-10-20 @ 17:36)  HR: 96 (07-10-20 @ 19:45) (96 - 111)  BP: 165/91 (07-10-20 @ 19:45) (152/78 - 165/91)  RR: 17 (07-10-20 @ 19:45) (17 - 18)  SpO2: 96% (07-10-20 @ 19:45) (95% - 96%)    PHYSICAL EXAM:  General: Confused, NAD  HEENT: Normocephalic, atraumatic, PERLLA, moist mucous membranes   Neck: Supple, nontender, no mass  Neurology: Alert and oriented x0, nonfocal   Respiratory: poor inspiratory effort, decreased bs   Cardio: S1,S2, no murmurs, rubs or gallops  Abdominal: Soft, non-distended bowel sounds present x4, no palpable masses  Extremities: No clubbing, cyanosis or edema, peripheral pulses present  MSK: Grimace with palpation over distal clavicle  Heme: left clavicular ecchymosis    Skin: warm, dry Vitals:  T(C): 36.7 (07-10-20 @ 19:45), Max: 38.1 (07-10-20 @ 17:36)  HR: 96 (07-10-20 @ 19:45) (96 - 111)  BP: 165/91 (07-10-20 @ 19:45) (152/78 - 165/91)  RR: 17 (07-10-20 @ 19:45) (17 - 18)  SpO2: 96% (07-10-20 @ 19:45) (95% - 96%)    PHYSICAL EXAM:  General: Confused, NAD, muttering intermittent Thai phrases  HEENT: Normocephalic, atraumatic, PERRL, moist mucous membranes   Neck: Supple, nontender to palpation  Neurology: awake, does not follow commands  Respiratory: poor inspiratory effort, decreased bs   Cardio: S1,S2, no murmurs, rubs or gallops  Abdominal: Soft, non-distended bowel sounds present x4, no palpable masses  Extremities: No clubbing, cyanosis or edema, peripheral pulses present  MSK: Grimace with palpation over distal clavicle  Heme: left clavicular ecchymosis    Skin: warm, dry

## 2020-07-10 NOTE — ED PROVIDER NOTE - OBJECTIVE STATEMENT
73 y/o female with PMHx Dementia BIB  due to fall last night. Unwitnessed fall, pt  reports going into the room right away after injury. Denies LOC. Pt  reports patient went to urgent care today in which noted clavicle fx and was advised to go to orthopedic office but they did not take their insurance. As per , pt baseline currently. Notes swelling and bruising to left shoulder.

## 2020-07-10 NOTE — CONSULT NOTE ADULT - SUBJECTIVE AND OBJECTIVE BOX
HPI: 74y year old Female hx dementia, nonverbal s/p fall last night brought in to ED by  for evaluation. Had ecchymosis on L dorsal shoulder.  No other obvious injuries. Fever and tachycardia noted in the ED, pending workup. No history to be obtained from patient due to mental status.     PMH:  Cellulitis  Depression  Dementia    PSH:  H/O abdominal hysterectomy  No significant past surgical history      Allergies:  No Known Allergies      O:  T(C): 36.7 (07-10-20 @ 19:45), Max: 38.1 (07-10-20 @ 17:36)  HR: 96 (07-10-20 @ 19:45) (96 - 111)  BP: 165/91 (07-10-20 @ 19:45) (152/78 - 165/91)  RR: 17 (07-10-20 @ 19:45) (17 - 18)  SpO2: 96% (07-10-20 @ 19:45) (95% - 96%)    Gen: nonverbal, NAD.   LUE:   ecchymosis L dorsal shoulder in region of distal clavicle.   No impending fracture, good soft tissue coverage beneath fracture.   No skin tenting.   Grimace with palpation over distal clavicle, no appreciable pain about remainder of upper extremity.   Hand and distal extremity well perfused.   Symmetric radial pulse 2+  Hand warm.   Moves hand spontaneously.   No regions of open skin or abrasions.     Cervical spine nontender without grimace or signs of pain.   Moves right UE spontaneously without gross deficit  Moves feet spontaneously  No UMN signs  Legs equal length without malrotation.   2+ DP/PT pulses    Imaging:  XR L shoulder/clavicle: type IIB distal clavicle fracture.   CXR: possible scapular body fracture, no seen on shoulder xr.   CT CSp: possible AI C4 VCF, G1 Anterolithesis C3-4, G1 Retro C4-5. No acute findings.     A/P 74y year old female with left IIB distal clavicle fracture, C4 AI VCF, possible scapular frx    FU CT shoulder  Not an operative candidate.   Needs frequent skin checks and sling to immobilize L shoulder  Pain Control  NWB LUE in sling  Will review imaging when completed.   medical workup for fever and tachycardia  Will DW attending. HPI: 74y year old Female hx dementia, nonverbal s/p fall last night brought in to ED by  for evaluation. Had ecchymosis on L dorsal shoulder.  No other obvious injuries. Fever and tachycardia noted in the ED, pending workup. No history to be obtained from patient due to mental status.     PMH:  Cellulitis  Depression  Dementia    PSH:  H/O abdominal hysterectomy  No significant past surgical history      Allergies:  No Known Allergies      O:  T(C): 36.7 (07-10-20 @ 19:45), Max: 38.1 (07-10-20 @ 17:36)  HR: 96 (07-10-20 @ 19:45) (96 - 111)  BP: 165/91 (07-10-20 @ 19:45) (152/78 - 165/91)  RR: 17 (07-10-20 @ 19:45) (17 - 18)  SpO2: 96% (07-10-20 @ 19:45) (95% - 96%)    Gen: nonverbal, NAD.   LUE:   ecchymosis L dorsal shoulder in region of distal clavicle.   No impending fracture, good soft tissue coverage beneath fracture.   No skin tenting.   Grimace with palpation over distal clavicle, no appreciable pain about remainder of upper extremity.   Hand and distal extremity well perfused.   Symmetric radial pulse 2+  Hand warm.   Moves hand spontaneously.   No regions of open skin or abrasions.     Cervical spine nontender without grimace or signs of pain.   Moves right UE spontaneously without gross deficit  Moves feet spontaneously  No UMN signs  Legs equal length without malrotation.   2+ DP/PT pulses    Imaging:  XR L shoulder/clavicle: type IIB distal clavicle fracture.   CT CSp: possible AI C4 VCF, G1 Anterolithesis C3-4, G1 Retro C4-5. No acute findings.     A/P 74y year old female with left IIB distal clavicle fracture, C4 AI VCF    Not an operative candidate given age and underlying cognitive status.   Needs frequent skin checks and sling to immobilize L shoulder  Pain Control  NWB LUE in sling  medical workup for fever and tachycardia  Will DW attending. HPI: 74y year old Female hx dementia, nonverbal s/p fall last night brought in to ED by  for evaluation. Had ecchymosis on L dorsal shoulder.  No other obvious injuries. Fever and tachycardia noted in the ED, pending workup. No history to be obtained from patient due to mental status.     PMH:  Cellulitis  Depression  Dementia    PSH:  H/O abdominal hysterectomy  No significant past surgical history      Allergies:  No Known Allergies      O:  T(C): 36.7 (07-10-20 @ 19:45), Max: 38.1 (07-10-20 @ 17:36)  HR: 96 (07-10-20 @ 19:45) (96 - 111)  BP: 165/91 (07-10-20 @ 19:45) (152/78 - 165/91)  RR: 17 (07-10-20 @ 19:45) (17 - 18)  SpO2: 96% (07-10-20 @ 19:45) (95% - 96%)    Gen: nonverbal, NAD.   LUE:   ecchymosis L dorsal shoulder in region of distal clavicle.   No impending fracture, good soft tissue coverage beneath fracture.   No skin tenting.   Grimace with palpation over distal clavicle, no appreciable pain about remainder of upper extremity.   Hand and distal extremity well perfused.   Symmetric radial pulse 2+  Hand warm.   Moves hand spontaneously.   No regions of open skin or abrasions.     Cervical spine nontender without grimace or signs of pain.   Moves right UE spontaneously without gross deficit  Moves feet spontaneously  No UMN signs  Legs equal length without malrotation.   2+ DP/PT pulses    Imaging:  XR L shoulder/clavicle: type IIB distal clavicle fracture.   CT CSp: possible AI C4 VCF, G1 Anterolithesis C3-4, G1 Retro C4-5. No acute findings.     A/P 74y year old female with left IIB distal clavicle fracture, C4 AI VCF    Not an operative candidate for clavicle fracture given age and underlying cognitive status.   Needs frequent skin checks and sling to immobilize L shoulder  Regarding cervical spine, C4 pathology on CT either vascular channel or vertebral body fracture, will require MRI to help further clarify and if true fracture will determine any ligamentous instability (that would require immobilization)  Pain Control  NWB LUE in sling  Soft collar until MRI performed.   medical workup for fever and tachycardia  DW Dr. Ramos

## 2020-07-10 NOTE — ED PROVIDER NOTE - CARE PLAN
Principal Discharge DX:	Clavicle fracture  Secondary Diagnosis:	Closed fracture of multiple ribs of left side, initial encounter  Secondary Diagnosis:	UTI (urinary tract infection)

## 2020-07-10 NOTE — H&P ADULT - NSICDXFAMILYHX_GEN_ALL_CORE_FT
FAMILY HISTORY:  Sibling  Still living? No  Family history of dementia, Age at diagnosis: Age Unknown

## 2020-07-10 NOTE — H&P ADULT - HISTORY OF PRESENT ILLNESS
73yo F w/ PMHx dementia, GERD (PPI), Hx RLE Cellulitis presenting s/p fall at home. Hx obtained from  as patient has baseline dementia. Patient was in bed last night where  in adjacent room heard "a thump" and rushed in to find patient on the floor. Patient was helped up from the floor by  and went back to sleep last night. This morning, pt went to  where imaging showed clavicular fracture. Pt was referred to ortho but was unable to be seen due to insurance. Per , patient was complaining of neck pain but had no complaints of fevers, headaches, changes in vision.    In the ED: /91, HR 96, T 98, CBC, coags wnl, glucose 140, UA pos for nitrite, LE, WBC, bacteria.   CT Head: No acute intracranial hemorrhage. No acute fracture cervical spine. Age-indeterminate mild compression deformity T4 vertebral body.   Xray- Humerus- Left humerus is intact without fracture dislocation.   X-ray shoulder- There is acute oblique slightly displaced fracture distal left clavicle. No other fractures. No dislocation.   CXR- Patient rotated. Low lung volumes. Heart not accurately evaluated on this projection. Atherosclerotic aorta. Fibrotic atelectatic changes at the lung bases. Right apical pleural thickening. No gross focal infiltrate or pleural effusion.  Xray Clavicle- There is complete acute oblique slightly displaced fracture distal left clavicle. No other fractures. No dislocation. Mild degenerative change acromioclavicular joint.  CT Left shoulder- 1. Comminuted, partially impacted fracture of the distal left clavicle, extends close to but does not involve the articular surface. Acromioclavicular joint is intact. 2. Acute fractures of the anterior left second through fifth ribs,, without evidence of underlying pulmonary injury or pneumothorax.  CT A/P- No evidence of acute intrathoracic or intra-abdominal pathology  CT Chest- No evidence of acute intrathoracic or intra-abdominal pathology 75yo F w/ PMHx dementia, GERD (PPI), Hx RLE Cellulitis presenting s/p fall at home. Hx obtained from  as patient has baseline dementia. Patient was in bed last night where  in adjacent room heard "a thump" and rushed in to find patient on the floor. Patient was helped up from the floor by  and went back to sleep last night. This morning, pt went to  where imaging showed clavicular fracture. Pt was referred to ortho but was unable to be seen due to insurance. Per , patient was complaining of neck pain but had no complaints of fevers, headaches, changes in vision.    In the ED: /91, HR 96, T 98, CBC, coags wnl, glucose 140, UA pos for nitrite, LE, WBC, bacteria.   EKG: Sr 81bpm, QTC 439ms  CT Head: No acute intracranial hemorrhage. No acute fracture cervical spine. Age-indeterminate mild compression deformity T4 vertebral body.   Xray- Humerus- Left humerus is intact without fracture dislocation.   X-ray shoulder- There is acute oblique slightly displaced fracture distal left clavicle. No other fractures. No dislocation.   CXR- Patient rotated. Low lung volumes. Heart not accurately evaluated on this projection. Atherosclerotic aorta. Fibrotic atelectatic changes at the lung bases. Right apical pleural thickening. No gross focal infiltrate or pleural effusion.  Xray Clavicle- There is complete acute oblique slightly displaced fracture distal left clavicle. No other fractures. No dislocation. Mild degenerative change acromioclavicular joint.  CT Left shoulder- 1. Comminuted, partially impacted fracture of the distal left clavicle, extends close to but does not involve the articular surface. Acromioclavicular joint is intact. 2. Acute fractures of the anterior left second through fifth ribs,, without evidence of underlying pulmonary injury or pneumothorax.  CT A/P- No evidence of acute intrathoracic or intra-abdominal pathology  CT Chest- No evidence of acute intrathoracic or intra-abdominal pathology

## 2020-07-10 NOTE — H&P ADULT - PROBLEM SELECTOR PLAN 2
Unwitnessed fall at home, ? related to UTI  CT head neg  Fall precautions  PT eval Unwitnessed fall at home, ? related to UTI  CT head neg  Fall precautions  Bedrest until MRI is completed  PT eval after MRI Unwitnessed fall at home, ? related to UTI  CT head negative. CT C-spine noted - Will require MRI to help further clarify and if true fracture will determine any ligamentous instability (that would require immobilization) per ortho - soft collar for now  no intrathoracic or intraabdominal compromise on trauma scan  Fall precautions  Bedrest until MRI is completed  PT eval after MRI

## 2020-07-10 NOTE — ED ADULT NURSE NOTE - OBJECTIVE STATEMENT
pt to er bought by  for evaluation of left arm and shoulder pain pt is extremely confused which is her baseline assisted on to er stretcher appears uncomfortable with movement

## 2020-07-10 NOTE — H&P ADULT - PROBLEM SELECTOR PLAN 3
UTI on admission  F/u blood, urine culture  Continue Rocephin Meets Sepsis criteria on admission with HR and temp, source likely UTI  S/p 30cc/kg  fluid resuscitation in ED  F/u blood, urine culture  Continue Rocephin

## 2020-07-10 NOTE — ED PROVIDER NOTE - ATTENDING CONTRIBUTION TO CARE
Pt seen and examined and d/w PA.  agree with a and p.  Pt is a 73 yo female with dementia sp fall oob last night.  pt this am went to  and found with left clavicle fx and sent to ortho. ortho didn't take pts insurance so sent to er. unknown if head trauma. in er, pt with fever, on exam confused, nc/at, left clavicle and shoulder with bruising and shoulder nt, 2+ pulses, no chest or abd ttp, hips nt, pelvis stable, on ct of clavicle as per ortho, pt with multiple upper rib fx, will get ct chest to ro any lung or thoracic injury/pn.  ua +, pt cultured, iv abx, will admit to [facility] if no intrathoracic injury of pain control, ortho tx and iv abx

## 2020-07-11 LAB
-  COAGULASE NEGATIVE STAPHYLOCOCCUS: SIGNIFICANT CHANGE UP
ANION GAP SERPL CALC-SCNC: 7 MMOL/L — SIGNIFICANT CHANGE UP (ref 5–17)
BASOPHILS # BLD AUTO: 0.03 K/UL — SIGNIFICANT CHANGE UP (ref 0–0.2)
BASOPHILS NFR BLD AUTO: 0.4 % — SIGNIFICANT CHANGE UP (ref 0–2)
BUN SERPL-MCNC: 14 MG/DL — SIGNIFICANT CHANGE UP (ref 7–23)
CALCIUM SERPL-MCNC: 8.6 MG/DL — SIGNIFICANT CHANGE UP (ref 8.5–10.1)
CHLORIDE SERPL-SCNC: 108 MMOL/L — SIGNIFICANT CHANGE UP (ref 96–108)
CO2 SERPL-SCNC: 27 MMOL/L — SIGNIFICANT CHANGE UP (ref 22–31)
CREAT SERPL-MCNC: 0.63 MG/DL — SIGNIFICANT CHANGE UP (ref 0.5–1.3)
EOSINOPHIL # BLD AUTO: 0.11 K/UL — SIGNIFICANT CHANGE UP (ref 0–0.5)
EOSINOPHIL NFR BLD AUTO: 1.4 % — SIGNIFICANT CHANGE UP (ref 0–6)
GLUCOSE SERPL-MCNC: 114 MG/DL — HIGH (ref 70–99)
GRAM STN FLD: SIGNIFICANT CHANGE UP
HCT VFR BLD CALC: 40 % — SIGNIFICANT CHANGE UP (ref 34.5–45)
HGB BLD-MCNC: 13.2 G/DL — SIGNIFICANT CHANGE UP (ref 11.5–15.5)
IMM GRANULOCYTES NFR BLD AUTO: 0.5 % — SIGNIFICANT CHANGE UP (ref 0–1.5)
LYMPHOCYTES # BLD AUTO: 1.84 K/UL — SIGNIFICANT CHANGE UP (ref 1–3.3)
LYMPHOCYTES # BLD AUTO: 23.9 % — SIGNIFICANT CHANGE UP (ref 13–44)
MAGNESIUM SERPL-MCNC: 2 MG/DL — SIGNIFICANT CHANGE UP (ref 1.6–2.6)
MCHC RBC-ENTMCNC: 29.7 PG — SIGNIFICANT CHANGE UP (ref 27–34)
MCHC RBC-ENTMCNC: 33 GM/DL — SIGNIFICANT CHANGE UP (ref 32–36)
MCV RBC AUTO: 90.1 FL — SIGNIFICANT CHANGE UP (ref 80–100)
METHOD TYPE: SIGNIFICANT CHANGE UP
MONOCYTES # BLD AUTO: 0.61 K/UL — SIGNIFICANT CHANGE UP (ref 0–0.9)
MONOCYTES NFR BLD AUTO: 7.9 % — SIGNIFICANT CHANGE UP (ref 2–14)
NEUTROPHILS # BLD AUTO: 5.08 K/UL — SIGNIFICANT CHANGE UP (ref 1.8–7.4)
NEUTROPHILS NFR BLD AUTO: 65.9 % — SIGNIFICANT CHANGE UP (ref 43–77)
NRBC # BLD: 0 /100 WBCS — SIGNIFICANT CHANGE UP (ref 0–0)
PHOSPHATE SERPL-MCNC: 2.9 MG/DL — SIGNIFICANT CHANGE UP (ref 2.5–4.5)
PLATELET # BLD AUTO: 223 K/UL — SIGNIFICANT CHANGE UP (ref 150–400)
POTASSIUM SERPL-MCNC: 3.6 MMOL/L — SIGNIFICANT CHANGE UP (ref 3.5–5.3)
POTASSIUM SERPL-SCNC: 3.6 MMOL/L — SIGNIFICANT CHANGE UP (ref 3.5–5.3)
RBC # BLD: 4.44 M/UL — SIGNIFICANT CHANGE UP (ref 3.8–5.2)
RBC # FLD: 13 % — SIGNIFICANT CHANGE UP (ref 10.3–14.5)
SARS-COV-2 IGG SERPL QL IA: NEGATIVE — SIGNIFICANT CHANGE UP
SARS-COV-2 IGM SERPL IA-ACNC: <0.1 INDEX — SIGNIFICANT CHANGE UP
SODIUM SERPL-SCNC: 142 MMOL/L — SIGNIFICANT CHANGE UP (ref 135–145)
SPECIMEN SOURCE: SIGNIFICANT CHANGE UP
WBC # BLD: 7.71 K/UL — SIGNIFICANT CHANGE UP (ref 3.8–10.5)
WBC # FLD AUTO: 7.71 K/UL — SIGNIFICANT CHANGE UP (ref 3.8–10.5)

## 2020-07-11 PROCEDURE — 99233 SBSQ HOSP IP/OBS HIGH 50: CPT

## 2020-07-11 RX ORDER — VANCOMYCIN HCL 1 G
1000 VIAL (EA) INTRAVENOUS ONCE
Refills: 0 | Status: COMPLETED | OUTPATIENT
Start: 2020-07-11 | End: 2020-07-11

## 2020-07-11 RX ADMIN — MIRTAZAPINE 30 MILLIGRAM(S): 45 TABLET, ORALLY DISINTEGRATING ORAL at 21:31

## 2020-07-11 RX ADMIN — Medication 250 MILLIGRAM(S): at 05:31

## 2020-07-11 RX ADMIN — Medication 250 MILLIGRAM(S): at 17:36

## 2020-07-11 RX ADMIN — ENOXAPARIN SODIUM 40 MILLIGRAM(S): 100 INJECTION SUBCUTANEOUS at 12:06

## 2020-07-11 RX ADMIN — CEFTRIAXONE 100 MILLIGRAM(S): 500 INJECTION, POWDER, FOR SOLUTION INTRAMUSCULAR; INTRAVENOUS at 21:31

## 2020-07-11 RX ADMIN — Medication 250 MILLIGRAM(S): at 23:00

## 2020-07-11 RX ADMIN — POLYETHYLENE GLYCOL 3350 17 GRAM(S): 17 POWDER, FOR SOLUTION ORAL at 12:07

## 2020-07-11 RX ADMIN — PANTOPRAZOLE SODIUM 40 MILLIGRAM(S): 20 TABLET, DELAYED RELEASE ORAL at 05:31

## 2020-07-11 RX ADMIN — Medication 0.25 MILLIGRAM(S): at 12:06

## 2020-07-11 NOTE — SWALLOW BEDSIDE ASSESSMENT ADULT - ASR SWALLOW ASPIRATION MONITOR
cough/upper respiratory infection/change of breathing pattern/position upright (90Y)/pneumonia/fever/oral hygiene/throat clearing/gurgly voice

## 2020-07-11 NOTE — SWALLOW BEDSIDE ASSESSMENT ADULT - SWALLOW EVAL: DIAGNOSIS
1. The patient demonstrates mild oral stage dysphagia with puree, mechanical soft and regular solids marked by reduced oral grading to the spoon, increased mastication time, delayed bolus collection, transfer and posterior transport. 2. The patient demonstrated mild pharyngeal stage dysphagia for all trialed consistencies marked by appearance of delayed swallow trigger with adequate hyolaryngeal elevation upon laryngeal palpation without evidence of laryngeal penetration. Noted patient with sustained phonation throughout evaluation despite SLP attempts to redirect.

## 2020-07-11 NOTE — SWALLOW BEDSIDE ASSESSMENT ADULT - COMMENTS
Per charting, patient is a "75yo F w/ PMHx dementia, GERD (PPI), Hx RLE Cellulitis presenting s/p fall at home. Hx obtained from  as patient has baseline dementia. Patient was in bed last night where  in adjacent room heard "a thump" and rushed in to find patient on the floor. Patient was helped up from the floor by  and went back to sleep last night. This morning, pt went to  where imaging showed clavicular fracture. Pt was referred to ortho but was unable to be seen due to insurance. Per , patient was complaining of neck pain but had no complaints of fevers, headaches, changes in vision."    CT CHEST 7/10: "No evidence of acute intrathoracic or intra-abdominal pathology"    CT HEAD 7/10: "No acute intracranial hemorrhage. No acute fracture cervical spine."    Consult received and chart reviewed. The patient was seen at bedside this AM to assess swallow function, at which time she was awake and confused. The patient did not follow directions/maintain eye contact and utilized jargon throughout the evaluation. Noted patient wearing neck collar at time of evaluation.

## 2020-07-11 NOTE — CHART NOTE - NSCHARTNOTEFT_GEN_A_CORE
called by RN re: blood cx with gram + cocci in clusters. being treated with rocephin for UTI.    Vital Signs Last 24 Hrs  T(C): 37.3 (11 Jul 2020 21:08), Max: 37.3 (11 Jul 2020 21:08)  T(F): 99.1 (11 Jul 2020 21:08), Max: 99.1 (11 Jul 2020 21:08)  HR: 86 (11 Jul 2020 21:08) (70 - 88)  BP: 131/72 (11 Jul 2020 21:08) (131/72 - 172/78)  RR: 20 (11 Jul 2020 21:08) (18 - 20)  SpO2: 94% (11 Jul 2020 21:08) (93% - 97%)    Gram + Bacteremia  - on rocephin for UTI - continue for now  - will give one dose of vancomycin for now pending culture ID and isolate  - repeat blood cx in AM. F/u urine cultures.  - consider ID consult if not contaminant

## 2020-07-12 DIAGNOSIS — R78.81 BACTEREMIA: ICD-10-CM

## 2020-07-12 LAB
ANION GAP SERPL CALC-SCNC: 5 MMOL/L — SIGNIFICANT CHANGE UP (ref 5–17)
BUN SERPL-MCNC: 13 MG/DL — SIGNIFICANT CHANGE UP (ref 7–23)
CALCIUM SERPL-MCNC: 8.3 MG/DL — LOW (ref 8.5–10.1)
CHLORIDE SERPL-SCNC: 105 MMOL/L — SIGNIFICANT CHANGE UP (ref 96–108)
CO2 SERPL-SCNC: 29 MMOL/L — SIGNIFICANT CHANGE UP (ref 22–31)
CREAT SERPL-MCNC: 0.59 MG/DL — SIGNIFICANT CHANGE UP (ref 0.5–1.3)
CULTURE RESULTS: SIGNIFICANT CHANGE UP
GLUCOSE SERPL-MCNC: 125 MG/DL — HIGH (ref 70–99)
GRAM STN FLD: SIGNIFICANT CHANGE UP
GRAM STN FLD: SIGNIFICANT CHANGE UP
HCT VFR BLD CALC: 41.4 % — SIGNIFICANT CHANGE UP (ref 34.5–45)
HGB BLD-MCNC: 13.6 G/DL — SIGNIFICANT CHANGE UP (ref 11.5–15.5)
MAGNESIUM SERPL-MCNC: 1.9 MG/DL — SIGNIFICANT CHANGE UP (ref 1.6–2.6)
MCHC RBC-ENTMCNC: 29.1 PG — SIGNIFICANT CHANGE UP (ref 27–34)
MCHC RBC-ENTMCNC: 32.9 GM/DL — SIGNIFICANT CHANGE UP (ref 32–36)
MCV RBC AUTO: 88.7 FL — SIGNIFICANT CHANGE UP (ref 80–100)
NRBC # BLD: 0 /100 WBCS — SIGNIFICANT CHANGE UP (ref 0–0)
ORGANISM # SPEC MICROSCOPIC CNT: SIGNIFICANT CHANGE UP
ORGANISM # SPEC MICROSCOPIC CNT: SIGNIFICANT CHANGE UP
PLATELET # BLD AUTO: 241 K/UL — SIGNIFICANT CHANGE UP (ref 150–400)
POTASSIUM SERPL-MCNC: 3.6 MMOL/L — SIGNIFICANT CHANGE UP (ref 3.5–5.3)
POTASSIUM SERPL-SCNC: 3.6 MMOL/L — SIGNIFICANT CHANGE UP (ref 3.5–5.3)
PROCALCITONIN SERPL-MCNC: <0.05 — SIGNIFICANT CHANGE UP (ref 0–0.04)
RBC # BLD: 4.67 M/UL — SIGNIFICANT CHANGE UP (ref 3.8–5.2)
RBC # FLD: 13 % — SIGNIFICANT CHANGE UP (ref 10.3–14.5)
SODIUM SERPL-SCNC: 139 MMOL/L — SIGNIFICANT CHANGE UP (ref 135–145)
SPECIMEN SOURCE: SIGNIFICANT CHANGE UP
WBC # BLD: 7.93 K/UL — SIGNIFICANT CHANGE UP (ref 3.8–10.5)
WBC # FLD AUTO: 7.93 K/UL — SIGNIFICANT CHANGE UP (ref 3.8–10.5)

## 2020-07-12 PROCEDURE — 99232 SBSQ HOSP IP/OBS MODERATE 35: CPT

## 2020-07-12 RX ADMIN — Medication 250 MILLIGRAM(S): at 05:17

## 2020-07-12 RX ADMIN — Medication 250 MILLIGRAM(S): at 17:21

## 2020-07-12 NOTE — PROVIDER CONTACT NOTE (CRITICAL VALUE NOTIFICATION) - SITUATION
Sofia NESBITT/ from the lab reported blood culture Growth in aerobic bottle: Gram Positive Cocci in Clusters
Nazanin Ramirez from the lab reported blood culture: Growth in aerobic bottle: Gram Positive Cocci in Clusters

## 2020-07-12 NOTE — PROVIDER CONTACT NOTE (CRITICAL VALUE NOTIFICATION) - ACTION/TREATMENT ORDERED:
The blood culture will be repeated in AM.
Dr. Clement will order one doze of vancomycin and the blood work will be repeated in AM.

## 2020-07-12 NOTE — PROVIDER CONTACT NOTE (CRITICAL VALUE NOTIFICATION) - TEST AND RESULT REPORTED:
Blood culture: Growth in aerobic bottle: Gram Positive Cocci in Clusters
Blood culture: Growth in aerobic bottle: Gram Positive Cocci in Clusters

## 2020-07-13 LAB
-  AMIKACIN: SIGNIFICANT CHANGE UP
-  AMOXICILLIN/CLAVULANIC ACID: SIGNIFICANT CHANGE UP
-  AMPICILLIN/SULBACTAM: SIGNIFICANT CHANGE UP
-  AMPICILLIN/SULBACTAM: SIGNIFICANT CHANGE UP
-  AMPICILLIN: SIGNIFICANT CHANGE UP
-  AZTREONAM: SIGNIFICANT CHANGE UP
-  CEFAZOLIN: SIGNIFICANT CHANGE UP
-  CEFAZOLIN: SIGNIFICANT CHANGE UP
-  CEFEPIME: SIGNIFICANT CHANGE UP
-  CEFOXITIN: SIGNIFICANT CHANGE UP
-  CEFTRIAXONE: SIGNIFICANT CHANGE UP
-  CIPROFLOXACIN: SIGNIFICANT CHANGE UP
-  CLINDAMYCIN: SIGNIFICANT CHANGE UP
-  ERTAPENEM: SIGNIFICANT CHANGE UP
-  ERYTHROMYCIN: SIGNIFICANT CHANGE UP
-  GENTAMICIN: SIGNIFICANT CHANGE UP
-  GENTAMICIN: SIGNIFICANT CHANGE UP
-  IMIPENEM: SIGNIFICANT CHANGE UP
-  LEVOFLOXACIN: SIGNIFICANT CHANGE UP
-  MEROPENEM: SIGNIFICANT CHANGE UP
-  NITROFURANTOIN: SIGNIFICANT CHANGE UP
-  OXACILLIN: SIGNIFICANT CHANGE UP
-  PENICILLIN: SIGNIFICANT CHANGE UP
-  PIPERACILLIN/TAZOBACTAM: SIGNIFICANT CHANGE UP
-  RIFAMPIN: SIGNIFICANT CHANGE UP
-  TETRACYCLINE: SIGNIFICANT CHANGE UP
-  TIGECYCLINE: SIGNIFICANT CHANGE UP
-  TOBRAMYCIN: SIGNIFICANT CHANGE UP
-  TRIMETHOPRIM/SULFAMETHOXAZOLE: SIGNIFICANT CHANGE UP
-  TRIMETHOPRIM/SULFAMETHOXAZOLE: SIGNIFICANT CHANGE UP
-  VANCOMYCIN: SIGNIFICANT CHANGE UP
ANION GAP SERPL CALC-SCNC: 4 MMOL/L — LOW (ref 5–17)
BUN SERPL-MCNC: 17 MG/DL — SIGNIFICANT CHANGE UP (ref 7–23)
CALCIUM SERPL-MCNC: 8.6 MG/DL — SIGNIFICANT CHANGE UP (ref 8.5–10.1)
CHLORIDE SERPL-SCNC: 106 MMOL/L — SIGNIFICANT CHANGE UP (ref 96–108)
CO2 SERPL-SCNC: 31 MMOL/L — SIGNIFICANT CHANGE UP (ref 22–31)
CREAT SERPL-MCNC: 0.62 MG/DL — SIGNIFICANT CHANGE UP (ref 0.5–1.3)
CULTURE RESULTS: SIGNIFICANT CHANGE UP
CULTURE RESULTS: SIGNIFICANT CHANGE UP
GLUCOSE SERPL-MCNC: 112 MG/DL — HIGH (ref 70–99)
HCT VFR BLD CALC: 40.2 % — SIGNIFICANT CHANGE UP (ref 34.5–45)
HGB BLD-MCNC: 13.3 G/DL — SIGNIFICANT CHANGE UP (ref 11.5–15.5)
MAGNESIUM SERPL-MCNC: 2.4 MG/DL — SIGNIFICANT CHANGE UP (ref 1.6–2.6)
MCHC RBC-ENTMCNC: 29.6 PG — SIGNIFICANT CHANGE UP (ref 27–34)
MCHC RBC-ENTMCNC: 33.1 GM/DL — SIGNIFICANT CHANGE UP (ref 32–36)
MCV RBC AUTO: 89.3 FL — SIGNIFICANT CHANGE UP (ref 80–100)
METHOD TYPE: SIGNIFICANT CHANGE UP
METHOD TYPE: SIGNIFICANT CHANGE UP
NRBC # BLD: 0 /100 WBCS — SIGNIFICANT CHANGE UP (ref 0–0)
ORGANISM # SPEC MICROSCOPIC CNT: SIGNIFICANT CHANGE UP
PLATELET # BLD AUTO: 249 K/UL — SIGNIFICANT CHANGE UP (ref 150–400)
POTASSIUM SERPL-MCNC: 3.8 MMOL/L — SIGNIFICANT CHANGE UP (ref 3.5–5.3)
POTASSIUM SERPL-SCNC: 3.8 MMOL/L — SIGNIFICANT CHANGE UP (ref 3.5–5.3)
RBC # BLD: 4.5 M/UL — SIGNIFICANT CHANGE UP (ref 3.8–5.2)
RBC # FLD: 12.7 % — SIGNIFICANT CHANGE UP (ref 10.3–14.5)
SODIUM SERPL-SCNC: 141 MMOL/L — SIGNIFICANT CHANGE UP (ref 135–145)
SPECIMEN SOURCE: SIGNIFICANT CHANGE UP
SPECIMEN SOURCE: SIGNIFICANT CHANGE UP
WBC # BLD: 6.22 K/UL — SIGNIFICANT CHANGE UP (ref 3.8–10.5)
WBC # FLD AUTO: 6.22 K/UL — SIGNIFICANT CHANGE UP (ref 3.8–10.5)

## 2020-07-13 PROCEDURE — 99232 SBSQ HOSP IP/OBS MODERATE 35: CPT

## 2020-07-13 PROCEDURE — 72141 MRI NECK SPINE W/O DYE: CPT | Mod: 26

## 2020-07-13 RX ADMIN — Medication 250 MILLIGRAM(S): at 05:45

## 2020-07-13 RX ADMIN — Medication 250 MILLIGRAM(S): at 17:10

## 2020-07-13 NOTE — PROGRESS NOTE ADULT - ATTENDING COMMENTS
The tx plan was discussed in detail with the patient and family members at the bedside. Their questions and concerns were addressed to the best of my ability. They are in agreement with the plan as detailed above. They demonstrated adequate understanding of the counseling which I have provided.      D/w spouse @ bedside yesterday and he will visit again today. I will update him when he is present.
The tx plan was discussed in detail with the patient and family members at the bedside. Their questions and concerns were addressed to the best of my ability. They are in agreement with the plan as detailed above. They demonstrated adequate understanding of the counseling which I have provided.      D/w spouse @ bedside again yesterday and he informed me that he will visit again today. RN has reached out to him to discuss MRI safety screen.

## 2020-07-13 NOTE — PROGRESS NOTE ADULT - PROBLEM SELECTOR PLAN 8
IMPROVE VTE Individual Risk Assessment          RISK                                                          Points  [  ] Previous VTE                                                3  [  ] Thrombophilia                                             2  [  ] Lower limb paralysis                                   2        (unable to hold up >15 seconds)    [  ] Current Cancer                                             2         (within 6 months)  [  ] Immobilization > 24 hrs                              1  [  ] ICU/CCU stay > 24 hours                             1  [x  ] Age > 60                                                         1    IMPROVE VTE Score: 0    Lovenox 40mg QD for DVT ppx   Miralax for bowel regimen
IMPROVE VTE Individual Risk Assessment          RISK                                                          Points  [  ] Previous VTE                                                3  [  ] Thrombophilia                                             2  [  ] Lower limb paralysis                                   2        (unable to hold up >15 seconds)    [  ] Current Cancer                                             2         (within 6 months)  [  ] Immobilization > 24 hrs                              1  [  ] ICU/CCU stay > 24 hours                             1  [x  ] Age > 60                                                         1    IMPROVE VTE Score: 0    Lovenox 40mg QD for DVT ppx   Miralax for bowel regimen

## 2020-07-13 NOTE — PROGRESS NOTE ADULT - NSHPATTENDINGPLANDISCUSS_GEN_ALL_CORE
pt, RN, overnight team, spouse - re: tx plan, disposition planning, above detailed plan
pt, RN - re: tx plan, disposition planning, above detailed plan
pt, RN, SW, CM, family - re: tx plan, disposition planning, above detailed plan

## 2020-07-13 NOTE — CHART NOTE - NSCHARTNOTEFT_GEN_A_CORE
MR C Spine depicts no acute pathology in the Cspine.  Chronic T2/T4 vertebral compression fractures noted  No concern for instability at this time. C Spine collar may be removed at this point    -No acute orthopedic surgical intervention needed at this time  -Ortho stable for discharge  -Recommend followup as outpatient as needed  -Will discuss with attending and advise if plan changes

## 2020-07-13 NOTE — PROGRESS NOTE ADULT - PROBLEM SELECTOR PLAN 1
sepsis 2/2 UTI, POA. sepsis has now resolved  F/u blood, urine culture  Continue Rocephin
sepsis 2/2 UTI, POA. sepsis has now resolved  UCx w/ GNR  Continue Rocephin  await final C/S
sepsis 2/2 UTI, POA. sepsis has now resolved  UCx w/ GNR  Continue Rocephin  F/u final C/S

## 2020-07-13 NOTE — PROGRESS NOTE ADULT - SUBJECTIVE AND OBJECTIVE BOX
HPI: 74y year old Female hx dementia, nonverbal s/p fall last night brought in to ED by  for evaluation. Had ecchymosis on L dorsal shoulder.  No other obvious injuries. Fever and tachycardia noted in the ED, pending workup. No history to be obtained from patient due to mental status.     7/11 Patient seen and examined at bedside this morning. Patient now in soft C Spine collar. Non-cooperative with physical exam due to mental status.     PMH:  Cellulitis  Depression  Dementia    PSH:  H/O abdominal hysterectomy  No significant past surgical history    Vital Signs Last 24 Hrs  T(C): 36.8 (11 Jul 2020 05:06), Max: 38.1 (10 Jul 2020 17:36)  T(F): 98.2 (11 Jul 2020 05:06), Max: 100.6 (10 Jul 2020 17:36)  HR: 70 (11 Jul 2020 05:06) (70 - 111)  BP: 164/80 (11 Jul 2020 05:06) (142/90 - 172/78)  BP(mean): --  RR: 18 (11 Jul 2020 05:06) (17 - 18)  SpO2: 97% (11 Jul 2020 05:06) (95% - 97%)    LABS:                        13.2   7.71  )-----------( 223      ( 11 Jul 2020 05:19 )             40.0     11 Jul 2020 05:19    142    |  108    |  14     ----------------------------<  114    3.6     |  27     |  0.63     Ca    8.6        11 Jul 2020 05:19  Phos  2.9       11 Jul 2020 05:19  Mg     2.0       11 Jul 2020 05:19    TPro  7.4    /  Alb  3.5    /  TBili  0.4    /  DBili  x      /  AST  20     /  ALT  22     /  AlkPhos  60     10 Jul 2020 20:30    PT/INR - ( 10 Jul 2020 20:30 )   PT: 12.3 sec;   INR: 1.05 ratio         PTT - ( 10 Jul 2020 20:30 )  PTT:31.1 sec    Gen: nonverbal, NAD, laying comfortably in soft cspine collar   LUE:   ecchymosis L dorsal shoulder in region of distal clavicle.   No impending fracture, good soft tissue coverage beneath fracture.   No skin tenting.   Grimace with palpation over distal clavicle, no appreciable pain about remainder of upper extremity.   Hand and distal extremity well perfused.   Symmetric radial pulse 2+  Hand warm.   Moves hand spontaneously.   No regions of open skin or abrasions.     Cervical spine nontender without grimace or signs of pain.   Moves right UE spontaneously without gross deficit  Moves feet spontaneously  No UMN signs  Legs equal length without malrotation.   2+ DP/PT pulses    Imaging:  XR L shoulder/clavicle: type IIB distal clavicle fracture.   CT CSp: possible AI C4 VCF, G1 Anterolithesis C3-4, G1 Retro C4-5. No acute findings.
Patient is a 74y old  Female who presents with a chief complaint of fall, clavicular fx (10 Jul 2020 23:02)      FROM ADMISSION H+P:   HPI:  73yo F w/ PMHx dementia, GERD (PPI), Hx RLE Cellulitis presenting s/p fall at home. Hx obtained from  as patient has baseline dementia. Patient was in bed last night where  in adjacent room heard "a thump" and rushed in to find patient on the floor. Patient was helped up from the floor by  and went back to sleep last night. This morning, pt went to  where imaging showed clavicular fracture. Pt was referred to ortho but was unable to be seen due to insurance. Per , patient was complaining of neck pain but had no complaints of fevers, headaches, changes in vision.    ----  INTERVAL HPI/OVERNIGHT EVENTS: Pt seen and evaluated at the bedside. No acute overnight events occurred. Pt is unable to provide any reliable history as she is nonverbal at baseline. Discussed w/ nursing, no events this morning. Ate all of her breakfast today    ----  PAST MEDICAL & SURGICAL HISTORY:  Depression  Dementia  H/O abdominal hysterectomy      FAMILY HISTORY:  Family history of dementia (Sibling)      Allergies    No Known Allergies    Intolerances        ----  REVIEW OF SYSTEMS:  unable to obtain any reliable ROS as the pt is nonverbal at baseline    ----  PHYSICAL EXAM:  GENERAL: patient appears chronically debilitated, nonverbal, no distress, resting comfortable  ENMT: oropharynx clear without erythema, moist mucous membranes  LUNGS: reduced air entry bilaterally, grossly clear to auscultation, no rales or wheezing b/l  HEART: soft S1/S2, regular rate and rhythm, no murmurs noted, no noted edema to b/l LE  GASTROINTESTINAL: abdomen is soft, nontender, nondistended, active bowel sounds, no palpable masses, no suprapubic pain elicited on exam  INTEGUMENT: scattered ecchymosis to L anterior chest wall and shoulder  MUSCULOSKELETAL: no clubbing or cyanosis, no obvious deformity, sling applied to LUE  NEUROLOGIC: pt is resting, fidgety, moving 4 extremities, does not follow commands    T(C): 36.8 (20 @ 05:06), Max: 38.1 (07-10-20 @ 17:36)  HR: 70 (20 @ 05:06) (70 - 111)  BP: 164/80 (20 @ 05:06) (142/90 - 172/78)  RR: 18 (20 @ 05:06) (17 - 18)  SpO2: 97% (20 @ 05:06) (95% - 97%)  Wt(kg): --    ----  I&O's Summary    10 Jul 2020 07:01  -  2020 07:00  --------------------------------------------------------  IN: 0 mL / OUT: 200 mL / NET: -200 mL        LABS:                        13.2   7.71  )-----------( 223      ( 2020 05:19 )             40.0     07-11    142  |  108  |  14  ----------------------------<  114<H>  3.6   |  27  |  0.63    Ca    8.6      2020 05:19  Phos  2.9     07-11  Mg     2.0     07-11    TPro  7.4  /  Alb  3.5  /  TBili  0.4  /  DBili  x   /  AST  20  /  ALT  22  /  AlkPhos  60  07-10    PT/INR - ( 10 Jul 2020 20:30 )   PT: 12.3 sec;   INR: 1.05 ratio         PTT - ( 10 Jul 2020 20:30 )  PTT:31.1 sec  Urinalysis Basic - ( 10 Jul 2020 20:48 )    Color: Yellow / Appearance: Turbid / S.020 / pH: x  Gluc: x / Ketone: Trace  / Bili: Negative / Urobili: Negative   Blood: x / Protein: 30 mg/dL / Nitrite: Positive   Leuk Esterase: Moderate / RBC: 0-2 /HPF / WBC 26-50   Sq Epi: x / Non Sq Epi: Few / Bacteria: Many      CAPILLARY BLOOD GLUCOSE                    ----  Personally reviewed:  Vital sign trends: [ x ] yes    [  ] no     [  ] n/a - hypertensive  Laboratory results: [ x ] yes    [  ] no     [  ] n/a  Radiology results: [ x ] yes    [  ] no     [  ] n/a - ct chest  Culture results: [  ] yes    [  ] no     [ x] n/a  Consultant recommendations: [ x ] yes    [  ] no     [  ] n/a
Patient is a 74y old  Female who presents with a chief complaint of fall, clavicular fx (12 Jul 2020 10:16)      FROM ADMISSION H+P:   HPI:  75yo F w/ PMHx dementia, GERD (PPI), Hx RLE Cellulitis presenting s/p fall at home. Hx obtained from  as patient has baseline dementia. Patient was in bed last night where  in adjacent room heard "a thump" and rushed in to find patient on the floor. Patient was helped up from the floor by  and went back to sleep last night. This morning, pt went to  where imaging showed clavicular fracture. Pt was referred to ortho but was unable to be seen due to insurance. Per , patient was complaining of neck pain but had no complaints of fevers, headaches, changes in vision.    ----  INTERVAL HPI/OVERNIGHT EVENTS: Pt seen and evaluated at the bedside. No acute overnight events occurred. Pt is essentially nonverbal and unable to provider any reliable history. No clinical events overnight. Remains afebrile. Urine appears somewhat dark but she is tolerating po well.     ----  PAST MEDICAL & SURGICAL HISTORY:  Depression  Dementia  H/O abdominal hysterectomy      FAMILY HISTORY:  Family history of dementia (Sibling)      Allergies    No Known Allergies    Intolerances        ----  REVIEW OF SYSTEMS:  unable to obtain any reliable ROS as the pt is nonverbal at baseline    ----  PHYSICAL EXAM:  GENERAL: patient appears somewhat disheveled. she's brian on her hospital bed and awkwardly lying in bed. also pulling on her cervical brace. no acute distress. she's mumbling. unable to interactive w/ this provider  LUNGS: reduced air entry bilaterally, grossly clear to auscultation, no rales or wheezing b/l  HEART: soft S1/S2, regular rate and rhythm, no murmurs noted, no noted edema to b/l LE  GASTROINTESTINAL: abdomen is soft, nontender, nondistended, active bowel sounds, no palpable masses, no suprapubic pain elicited on exam  INTEGUMENT: scattered ecchymosis to L anterior chest wall and shoulder  MUSCULOSKELETAL: no clubbing or cyanosis, no obvious deformity, sling applied to LUE  NEUROLOGIC: pt is resting, remains fidgety, moving 4 extremities, does not follow commands    T(C): 36.8 (07-13-20 @ 04:59), Max: 37.3 (07-12-20 @ 13:00)  HR: 75 (07-13-20 @ 04:59) (75 - 96)  BP: 128/77 (07-13-20 @ 04:59) (119/73 - 139/76)  RR: 20 (07-13-20 @ 04:59) (17 - 20)  SpO2: 96% (07-13-20 @ 04:59) (90% - 96%)  Wt(kg): --    ----  I&O's Summary    12 Jul 2020 07:01  -  13 Jul 2020 07:00  --------------------------------------------------------  IN: 100 mL / OUT: 850 mL / NET: -750 mL        LABS:                        13.3   6.22  )-----------( 249      ( 13 Jul 2020 06:21 )             40.2     07-13    141  |  106  |  17  ----------------------------<  112<H>  3.8   |  31  |  0.62    Ca    8.6      13 Jul 2020 06:21  Mg     2.4     07-13          CAPILLARY BLOOD GLUCOSE          07-11 @ 01:05   >100,000 CFU/ml Gram Negative Rods  --  --  07-11 @ 00:17   Growth in aerobic bottle: Staphylococcus epidermidis  Susceptibility to follow.  --  Blood Culture PCR            ----  Personally reviewed:  Vital sign trends: [ x ] yes    [  ] no     [  ] n/a  Laboratory results: [ x ] yes    [  ] no     [  ] n/a  Radiology results: [  ] yes    [  ] no     [ x ] n/a - MRI c-spine pending  Culture results: [ x ] yes    [  ] no     [  ] n/a - await final c/s of urine. await results of repeat bcx  Consultant recommendations: [ x ] yes    [  ] no     [  ] n/a
Patient is a 74y old  Female who presents with a chief complaint of fall, clavicular fx (2020 12:11)      FROM ADMISSION H+P:   HPI:  75yo F w/ PMHx dementia, GERD (PPI), Hx RLE Cellulitis presenting s/p fall at home. Hx obtained from  as patient has baseline dementia. Patient was in bed last night where  in adjacent room heard "a thump" and rushed in to find patient on the floor. Patient was helped up from the floor by  and went back to sleep last night. This morning, pt went to  where imaging showed clavicular fracture. Pt was referred to ortho but was unable to be seen due to insurance. Per , patient was complaining of neck pain but had no complaints of fevers, headaches, changes in vision.    ----  INTERVAL HPI/OVERNIGHT EVENTS: Pt seen and evaluated at the bedside. No acute clinical overnight events occurred. Blood cx's great CoNS in 4 bottles, suspect contaminant. Afebrile. Pt is essentially nonverbal and cannot obtain any reliable history from her.     ----  PAST MEDICAL & SURGICAL HISTORY:  Depression  Dementia  H/O abdominal hysterectomy      FAMILY HISTORY:  Family history of dementia (Sibling)      Allergies    No Known Allergies    Intolerances        ----  REVIEW OF SYSTEMS:  unable to obtain any reliable ROS as the pt is nonverbal at baseline    ----  PHYSICAL EXAM:  GENERAL: patient appears chronically debilitated, nonverbal, no distress, resting comfortable, nontoxic  LUNGS: reduced air entry bilaterally, grossly clear to auscultation, no rales or wheezing b/l  HEART: soft S1/S2, regular rate and rhythm, no murmurs noted, no noted edema to b/l LE  GASTROINTESTINAL: abdomen is soft, nontender, nondistended, active bowel sounds, no palpable masses, no suprapubic pain elicited on exam  INTEGUMENT: scattered ecchymosis to L anterior chest wall and shoulder  MUSCULOSKELETAL: no clubbing or cyanosis, no obvious deformity, sling applied to LUE  NEUROLOGIC: pt is resting, remains fidgety, moving 4 extremities, does not follow commands    T(C): 37.1 (20 @ 04:54), Max: 37.3 (20 @ 21:08)  HR: 85 (20 @ 04:54) (85 - 88)  BP: 144/85 (20 @ 04:54) (131/72 - 148/79)  RR: 20 (20 @ 04:54) (20 - 20)  SpO2: 95% (20 @ 04:54) (93% - 95%)  Wt(kg): --    ----  I&O's Summary    2020 07:01  -  2020 07:00  --------------------------------------------------------  IN: 0 mL / OUT: 700 mL / NET: -700 mL        LABS:                        13.6   7.93  )-----------( 241      ( 2020 05:24 )             41.4     07-12    139  |  105  |  13  ----------------------------<  125<H>  3.6   |  29  |  0.59    Ca    8.3<L>      2020 05:24  Phos  2.9     07-11  Mg     1.9     -12    TPro  7.4  /  Alb  3.5  /  TBili  0.4  /  DBili  x   /  AST  20  /  ALT  22  /  AlkPhos  60  07-10    PT/INR - ( 10 Jul 2020 20:30 )   PT: 12.3 sec;   INR: 1.05 ratio         PTT - ( 10 Jul 2020 20:30 )  PTT:31.1 sec  Urinalysis Basic - ( 10 Jul 2020 20:48 )    Color: Yellow / Appearance: Turbid / S.020 / pH: x  Gluc: x / Ketone: Trace  / Bili: Negative / Urobili: Negative   Blood: x / Protein: 30 mg/dL / Nitrite: Positive   Leuk Esterase: Moderate / RBC: 0-2 /HPF / WBC 26-50   Sq Epi: x / Non Sq Epi: Few / Bacteria: Many      CAPILLARY BLOOD GLUCOSE           @ 01:05   >100,000 CFU/ml Gram Negative Rods  --  --   @ 00:17   Growth in aerobic bottle: Gram Positive Cocci in Clusters  --  Blood Culture PCR            ----  Personally reviewed:  Vital sign trends: [ x ] yes    [  ] no     [  ] n/a  Laboratory results: [ x ] yes    [  ] no     [  ] n/a  Radiology results: [ x ] yes    [  ] no     [  ] n/a  Culture results: [ x ] yes    [  ] no     [  ] n/a  Consultant recommendations: [  ] yes    [  ] no     [  x] n/a

## 2020-07-13 NOTE — PROGRESS NOTE ADULT - PROBLEM SELECTOR PLAN 7
Chronic  Continue Pantoprazole
Chronic  Continue Pantoprazole
IMPROVE VTE Individual Risk Assessment          RISK                                                          Points  [  ] Previous VTE                                                3  [  ] Thrombophilia                                             2  [  ] Lower limb paralysis                                   2        (unable to hold up >15 seconds)    [  ] Current Cancer                                             2         (within 6 months)  [  ] Immobilization > 24 hrs                              1  [  ] ICU/CCU stay > 24 hours                             1  [x  ] Age > 60                                                         1    IMPROVE VTE Score: 0    Lovenox 40mg QD for DVT ppx   Miralax for bowel regimen

## 2020-07-13 NOTE — PROGRESS NOTE ADULT - PROBLEM SELECTOR PLAN 2
Clavicular fx s/p fall at home  - deemed not an operative candidate for clavicle fracture given age and underlying cognitive status  - sling to immobilize L shoulder, NWB LUE in sling  - Soft collar until MRI performed of cervical spine
Suspect contaminant  - s/p 1g vanc x1, will hold off on further gram positive coverage for now  - repeat BCx  - serial clinical exams, pt is nontoxic, no leukocytosis, fever resolved w/ ceftriaxone  - because all 4 bottles were positive, will monitor inpatient until f/u BCx's from 7/12 result. d/c planning for tomorrow pending MRI C-spine findings
Suspect contaminant  - s/p 1g vanc x1, will hold off on further gram positive coverage for now  - repeat BCx  - serial clinical exams, pt is nontoxic, no leukocytosis, fever resolved w/ ceftriaxone  - because all 4 bottles were positive, will monitor inpatient until f/u BCx's from 7/12 result

## 2020-07-13 NOTE — PROGRESS NOTE ADULT - PROBLEM SELECTOR PLAN 4
Unwitnessed fall at home, ? related to UTI  CT head negative. CT C-spine noted - Will require MRI to help further clarify and if true fracture will determine any ligamentous instability (that would require immobilization) per ortho - soft collar for now  no intrathoracic or intraabdominal compromise on trauma scan  Fall precautions  Bedrest until MRI is completed  PT eval after MRI
Baseline dementia  Continue Remeron  Continue Xanax .25mg Qd (takes daily per )  hx of dysphagia, S&S eval, c/w dysphagia 3 diet in interim as she is tolerating this well
Unwitnessed fall at home, ? related to UTI  CT head negative. CT C-spine noted - Will require MRI to help further clarify and if true fracture will determine any ligamentous instability (that would require immobilization) per ortho - soft collar for now  no intrathoracic or intraabdominal compromise on trauma scan  Fall precautions  Bedrest until MRI is completed  PT eval after MRI  D/w CM. can alert home care services to potentially plan for d/c home tomorrow pending findings of MRI c-spine

## 2020-07-13 NOTE — PROGRESS NOTE ADULT - PROBLEM SELECTOR PLAN 3
Clavicular fx s/p fall at home  - deemed not an operative candidate for clavicle fracture given age and underlying cognitive status  - sling to immobilize L shoulder, NWB LUE in sling  - Soft collar until MRI performed of cervical spine
Unwitnessed fall at home, ? related to UTI  CT head negative. CT C-spine noted - Will require MRI to help further clarify and if true fracture will determine any ligamentous instability (that would require immobilization) per ortho - soft collar for now  no intrathoracic or intraabdominal compromise on trauma scan  Fall precautions  Bedrest until MRI is completed  PT eval after MRI
Clavicular fx s/p fall at home  - deemed not an operative candidate for clavicle fracture given age and underlying cognitive status  - sling to immobilize L shoulder, NWB LUE in sling  - Soft collar until MRI performed of cervical spine

## 2020-07-13 NOTE — PROGRESS NOTE ADULT - PROBLEM SELECTOR PLAN 5
Baseline dementia  Continue Remeron  Continue Xanax .25mg Qd (takes daily per )  c/w dysphagia 3 diet in interim as she is tolerating this well  d/w SLP, continue current diet
Baseline dementia  Continue Remeron  Continue Xanax .25mg Qd (takes daily per )  c/w dysphagia 3 diet in interim as she is tolerating this well  d/w SLP, continue current diet
Chronic  Continue Remeron

## 2020-07-14 ENCOUNTER — TRANSCRIPTION ENCOUNTER (OUTPATIENT)
Age: 75
End: 2020-07-14

## 2020-07-14 VITALS
RESPIRATION RATE: 18 BRPM | OXYGEN SATURATION: 96 % | DIASTOLIC BLOOD PRESSURE: 81 MMHG | TEMPERATURE: 97 F | SYSTOLIC BLOOD PRESSURE: 110 MMHG | HEART RATE: 79 BPM

## 2020-07-14 LAB
ANION GAP SERPL CALC-SCNC: 5 MMOL/L — SIGNIFICANT CHANGE UP (ref 5–17)
BUN SERPL-MCNC: 15 MG/DL — SIGNIFICANT CHANGE UP (ref 7–23)
CALCIUM SERPL-MCNC: 8.8 MG/DL — SIGNIFICANT CHANGE UP (ref 8.5–10.1)
CHLORIDE SERPL-SCNC: 107 MMOL/L — SIGNIFICANT CHANGE UP (ref 96–108)
CO2 SERPL-SCNC: 29 MMOL/L — SIGNIFICANT CHANGE UP (ref 22–31)
CREAT SERPL-MCNC: 0.64 MG/DL — SIGNIFICANT CHANGE UP (ref 0.5–1.3)
GLUCOSE SERPL-MCNC: 125 MG/DL — HIGH (ref 70–99)
HCT VFR BLD CALC: 42.1 % — SIGNIFICANT CHANGE UP (ref 34.5–45)
HGB BLD-MCNC: 14.2 G/DL — SIGNIFICANT CHANGE UP (ref 11.5–15.5)
MAGNESIUM SERPL-MCNC: 2.4 MG/DL — SIGNIFICANT CHANGE UP (ref 1.6–2.6)
MCHC RBC-ENTMCNC: 30 PG — SIGNIFICANT CHANGE UP (ref 27–34)
MCHC RBC-ENTMCNC: 33.7 GM/DL — SIGNIFICANT CHANGE UP (ref 32–36)
MCV RBC AUTO: 89 FL — SIGNIFICANT CHANGE UP (ref 80–100)
NRBC # BLD: 0 /100 WBCS — SIGNIFICANT CHANGE UP (ref 0–0)
PLATELET # BLD AUTO: 235 K/UL — SIGNIFICANT CHANGE UP (ref 150–400)
POTASSIUM SERPL-MCNC: 3.9 MMOL/L — SIGNIFICANT CHANGE UP (ref 3.5–5.3)
POTASSIUM SERPL-SCNC: 3.9 MMOL/L — SIGNIFICANT CHANGE UP (ref 3.5–5.3)
RBC # BLD: 4.73 M/UL — SIGNIFICANT CHANGE UP (ref 3.8–5.2)
RBC # FLD: 12.4 % — SIGNIFICANT CHANGE UP (ref 10.3–14.5)
SODIUM SERPL-SCNC: 141 MMOL/L — SIGNIFICANT CHANGE UP (ref 135–145)
WBC # BLD: 7.11 K/UL — SIGNIFICANT CHANGE UP (ref 3.8–10.5)
WBC # FLD AUTO: 7.11 K/UL — SIGNIFICANT CHANGE UP (ref 3.8–10.5)

## 2020-07-14 PROCEDURE — 80053 COMPREHEN METABOLIC PANEL: CPT

## 2020-07-14 PROCEDURE — 93005 ELECTROCARDIOGRAM TRACING: CPT

## 2020-07-14 PROCEDURE — 86769 SARS-COV-2 COVID-19 ANTIBODY: CPT

## 2020-07-14 PROCEDURE — 83605 ASSAY OF LACTIC ACID: CPT

## 2020-07-14 PROCEDURE — 84100 ASSAY OF PHOSPHORUS: CPT

## 2020-07-14 PROCEDURE — 81001 URINALYSIS AUTO W/SCOPE: CPT

## 2020-07-14 PROCEDURE — 36415 COLL VENOUS BLD VENIPUNCTURE: CPT

## 2020-07-14 PROCEDURE — 73000 X-RAY EXAM OF COLLAR BONE: CPT

## 2020-07-14 PROCEDURE — 73060 X-RAY EXAM OF HUMERUS: CPT

## 2020-07-14 PROCEDURE — 99239 HOSP IP/OBS DSCHRG MGMT >30: CPT

## 2020-07-14 PROCEDURE — 71260 CT THORAX DX C+: CPT

## 2020-07-14 PROCEDURE — 85730 THROMBOPLASTIN TIME PARTIAL: CPT

## 2020-07-14 PROCEDURE — 84145 PROCALCITONIN (PCT): CPT

## 2020-07-14 PROCEDURE — 80048 BASIC METABOLIC PNL TOTAL CA: CPT

## 2020-07-14 PROCEDURE — 85027 COMPLETE CBC AUTOMATED: CPT

## 2020-07-14 PROCEDURE — 72125 CT NECK SPINE W/O DYE: CPT

## 2020-07-14 PROCEDURE — 85610 PROTHROMBIN TIME: CPT

## 2020-07-14 PROCEDURE — 97162 PT EVAL MOD COMPLEX 30 MIN: CPT

## 2020-07-14 PROCEDURE — 70450 CT HEAD/BRAIN W/O DYE: CPT

## 2020-07-14 PROCEDURE — 74177 CT ABD & PELVIS W/CONTRAST: CPT

## 2020-07-14 PROCEDURE — 87086 URINE CULTURE/COLONY COUNT: CPT

## 2020-07-14 PROCEDURE — 99285 EMERGENCY DEPT VISIT HI MDM: CPT | Mod: 25

## 2020-07-14 PROCEDURE — 96374 THER/PROPH/DIAG INJ IV PUSH: CPT | Mod: XU

## 2020-07-14 PROCEDURE — 72170 X-RAY EXAM OF PELVIS: CPT

## 2020-07-14 PROCEDURE — 92610 EVALUATE SWALLOWING FUNCTION: CPT

## 2020-07-14 PROCEDURE — 87150 DNA/RNA AMPLIFIED PROBE: CPT

## 2020-07-14 PROCEDURE — 87040 BLOOD CULTURE FOR BACTERIA: CPT

## 2020-07-14 PROCEDURE — 87186 SC STD MICRODIL/AGAR DIL: CPT

## 2020-07-14 PROCEDURE — 83735 ASSAY OF MAGNESIUM: CPT

## 2020-07-14 PROCEDURE — 73200 CT UPPER EXTREMITY W/O DYE: CPT

## 2020-07-14 PROCEDURE — 87635 SARS-COV-2 COVID-19 AMP PRB: CPT

## 2020-07-14 PROCEDURE — 71045 X-RAY EXAM CHEST 1 VIEW: CPT

## 2020-07-14 PROCEDURE — 73030 X-RAY EXAM OF SHOULDER: CPT

## 2020-07-14 PROCEDURE — 72141 MRI NECK SPINE W/O DYE: CPT

## 2020-07-14 RX ORDER — CEFPODOXIME PROXETIL 100 MG
1 TABLET ORAL
Qty: 12 | Refills: 0
Start: 2020-07-14 | End: 2020-07-19

## 2020-07-14 RX ORDER — SACCHAROMYCES BOULARDII 250 MG
1 POWDER IN PACKET (EA) ORAL
Qty: 0 | Refills: 0 | DISCHARGE
Start: 2020-07-14

## 2020-07-14 RX ADMIN — Medication 250 MILLIGRAM(S): at 05:23

## 2020-07-14 NOTE — DISCHARGE NOTE PROVIDER - NSDCMRMEDTOKEN_GEN_ALL_CORE_FT
ALPRAZolam 0.25 mg oral tablet: 1 tab(s) orally once a day, As Needed  cefpodoxime 200 mg oral tablet: 1 tab(s) orally every 12 hours   MiraLax oral powder for reconstitution: 17 gram(s) orally once a day  mirtazapine 30 mg oral tablet: 1 tab(s) orally once a day (at bedtime)  omeprazole 40 mg oral delayed release capsule: 1 cap(s) orally once a day  saccharomyces boulardii lyo 250 mg oral capsule: 1 cap(s) orally 2 times a day, any OTC probiotic is acceptable - follow instructions on the bottle, recommend to continue x2wks

## 2020-07-14 NOTE — PHYSICAL THERAPY INITIAL EVALUATION ADULT - MANUAL MUSCLE TESTING RESULTS, REHAB EVAL
no strength deficits were identified/at least 3/5 except left UE NT, unable to formally assess secondary to poorly following commands.

## 2020-07-14 NOTE — DISCHARGE NOTE PROVIDER - NSDCCPCAREPLAN_GEN_ALL_CORE_FT
PRINCIPAL DISCHARGE DIAGNOSIS  Diagnosis: Clavicle fracture  Assessment and Plan of Treatment: Supportive care. Continue sling and suggest following up with Dr. Ramos within 2 weeks of discharge from the hospital. Recommend taking extra strength tylenol (follow instructions on the bottle) for pain relief.      SECONDARY DISCHARGE DIAGNOSES  Diagnosis: UTI (urinary tract infection)  Assessment and Plan of Treatment: Continue oral antibiotics. Fever was present while in the ER but this has since resolved. If pt is not able to tolerate antibiotics, suggest following up with primary care provider to discuss further. If symptoms worsen, return to the ED.

## 2020-07-14 NOTE — DISCHARGE NOTE PROVIDER - NSDCFUADDINST_GEN_ALL_CORE_FT
Recommend call to schedule your follow up appointments with your doctors (primary care doctor, orthopedics)  Recommend that you take your medications as detailed in your medication reconciliation  Recommend return to the ED for worsening of your medical condition

## 2020-07-14 NOTE — DISCHARGE NOTE PROVIDER - CARE PROVIDER_API CALL
Ashok Begum  INTERNAL MEDICINE  [address] [place] [address]  Phone: [phone]  Fax: [phone]  Established Patient  Follow Up Time: 1 week    Noe Ramos  ORTHOPAEDIC SURGERY  [address]  [place] [address]  Phone: [phone]  Fax: [phone]  Established Patient  Follow Up Time: 2 weeks

## 2020-07-14 NOTE — DISCHARGE NOTE PROVIDER - PROVIDER TOKENS
PROVIDER:[TOKEN:[[id number]],FOLLOWUP:[1 week],ESTABLISHEDPATIENT:[T]],PROVIDER:[TOKEN:[[id number]],FOLLOWUP:[2 weeks],ESTABLISHEDPATIENT:[T]]

## 2020-07-14 NOTE — DISCHARGE NOTE NURSING/CASE MANAGEMENT/SOCIAL WORK - PATIENT PORTAL LINK FT
You can access the FollowMyHealth Patient Portal offered by [facility] by registering at the following website: [email or web address]. By joining [facility]’s FollowMyHealth portal, you will also be able to view your health information using other applications (apps) compatible with our system.

## 2020-07-14 NOTE — PHYSICAL THERAPY INITIAL EVALUATION ADULT - GENERAL OBSERVATIONS, REHAB EVAL
July 25, 2018      Syed Pinto MD  [address]  [place] [address]           [facility]  [address]  [facility] [address]  Phone: [phone]          Patient: Jessie Araujo   MR Number: [id number]   YOB: 2004   Date of Visit: 7/25/2018       Dear Dr. Syed Pinto:    Thank you for referring Jessie Araujo to me for evaluation. Attached you will find relevant portions of my assessment and plan of care.    If you have questions, please do not hesitate to call me. I look forward to following Jessie Araujo along with you.    Sincerely,    Ann Marie Herrera, DIALLO    Enclosure  CC:  No Recipients    If you would like to receive this communication electronically, please contact [email or web address] or [phone] to request more information on [employer] Link access.    For providers and/or their staff who would like to refer a patient to [facility], please contact us through our one-stop-shop provider referral line, [facility], at [phone].    If you feel you have received this communication in error or would no longer like to receive these types of communications, please e-mail [email or web address]          Pt received in bed, left UE sling in place, external female catheter in place, NAD.

## 2020-07-14 NOTE — DISCHARGE NOTE PROVIDER - HOSPITAL COURSE
FROM ADMISSION H+P:     HPI:    75yo F w/ PMHx dementia, GERD (PPI), Hx RLE Cellulitis presenting s/p fall at home. Hx obtained from  as patient has baseline dementia. Patient was in bed last night where  in adjacent room heard "a thump" and rushed in to find patient on the floor. Patient was helped up from the floor by  and went back to sleep last night. This morning, pt went to  where imaging showed clavicular fracture. Pt was referred to ortho but was unable to be seen due to insurance. Per , patient was complaining of neck pain but had no complaints of fevers, headaches, changes in vision.            ---    HOSPITAL COURSE:     Pt has advanced dementia. Pt was admitted s/p fall and found to have a clavicular fx and concern for a cervical spine injury. She was placed in sling, NWB LUE and deemed to not be an operative candidate given chronic cogntiive impairment. Cervical spine was evaluated w/ MRI and no acute findings were noted so the soft collar was d/c'd. She was also admitted w/ fever and found to be septic 2/2 GNR UTI. E.coli grew in urine culture which was only resistant to fluoroquinolones. Started on ceftriaxone and will transition to 200mg bid cefpodoxime to complete 10d course. No evidence of pyelo on CT scan. All 4 initial blood cx bottles were positive for CoNS, 1 dose of vanco was given before the species was ID'd. Repeat BCx all were ngtd. Pt was febrile on admission but fever resolved after starting abx. Now there is no evidence of uncontrolled infectious process. Evaluated by PT and pt is at baseline level of need. 24hrs notice to home health care agency was given yesterday, pt is now stable for dc home on po abx.        ---    CONSULTANTS:     Ortho (Rachel)        ---    TIME SPENT:    I, the attending physician, was physically present for the key portions of the evaluation and management (E/M) service provided. The total amount of time spent reviewing the hospital notes, laboratory values, imaging findings, assessing/counseling the patient, discussing with consultant physicians, social work, nursing staff was 40 minutes        ---    T(C): 36.8 (07-14-20 @ 04:43), Max: 37 (07-13-20 @ 21:31)    HR: 75 (07-14-20 @ 04:43) (75 - 88)    BP: 128/76 (07-14-20 @ 04:43) (116/71 - 128/76)    RR: 18 (07-14-20 @ 04:43) (17 - 18)    SpO2: 97% (07-14-20 @ 04:43) (94% - 97%)        PHYSICAL EXAM:    GENERAL: patient appears comfortable, no acute distress    LUNGS: reduced air entry bilaterally, grossly clear to auscultation, no rales or wheezing b/l    NECK: soft collar was dc'd    HEART: soft S1/S2, regular rate and rhythm, no murmurs noted, no noted edema to b/l LE    GASTROINTESTINAL: abdomen is soft, nontender, nondistended, active bowel sounds, no palpable masses, no suprapubic pain elicited on exam    INTEGUMENT: scattered ecchymosis to L anterior chest wall and shoulder    MUSCULOSKELETAL: no clubbing or cyanosis, no obvious deformity, sling applied to LUE    NEUROLOGIC: pt is resting, remains fidgety, moving 4 extremities, does not follow commands

## 2020-12-09 ENCOUNTER — APPOINTMENT (OUTPATIENT)
Dept: PSYCHIATRY | Facility: CLINIC | Age: 75
End: 2020-12-09
Payer: MEDICARE

## 2020-12-09 DIAGNOSIS — R45.4 IRRITABILITY AND ANGER: ICD-10-CM

## 2020-12-09 PROCEDURE — 99214 OFFICE O/P EST MOD 30 MIN: CPT

## 2021-06-02 ENCOUNTER — APPOINTMENT (OUTPATIENT)
Dept: PSYCHIATRY | Facility: CLINIC | Age: 76
End: 2021-06-02

## 2021-06-23 ENCOUNTER — APPOINTMENT (OUTPATIENT)
Dept: PSYCHIATRY | Facility: CLINIC | Age: 76
End: 2021-06-23

## 2021-06-28 ENCOUNTER — APPOINTMENT (OUTPATIENT)
Dept: PSYCHIATRY | Facility: CLINIC | Age: 76
End: 2021-06-28
Payer: MEDICARE

## 2021-06-28 DIAGNOSIS — F32.9 ALZHEIMER'S DISEASE, UNSPECIFIED: ICD-10-CM

## 2021-06-28 DIAGNOSIS — F02.80 ALZHEIMER'S DISEASE, UNSPECIFIED: ICD-10-CM

## 2021-06-28 DIAGNOSIS — G47.00 INSOMNIA, UNSPECIFIED: ICD-10-CM

## 2021-06-28 DIAGNOSIS — G30.9 ALZHEIMER'S DISEASE, UNSPECIFIED: ICD-10-CM

## 2021-06-28 PROCEDURE — 99214 OFFICE O/P EST MOD 30 MIN: CPT

## 2021-06-28 RX ORDER — MIRTAZAPINE 30 MG/1
30 TABLET, FILM COATED ORAL
Qty: 90 | Refills: 1 | Status: ACTIVE | COMMUNITY
Start: 2018-10-18 | End: 1900-01-01

## 2021-12-09 RX ORDER — ALPRAZOLAM 0.5 MG/1
0.5 TABLET ORAL
Qty: 60 | Refills: 0 | Status: ACTIVE | COMMUNITY
Start: 2018-10-18 | End: 1900-01-01

## 2021-12-27 ENCOUNTER — APPOINTMENT (OUTPATIENT)
Dept: PSYCHIATRY | Facility: CLINIC | Age: 76
End: 2021-12-27

## 2022-12-13 ENCOUNTER — EMERGENCY (EMERGENCY)
Facility: HOSPITAL | Age: 77
LOS: 1 days | End: 2022-12-13
Attending: EMERGENCY MEDICINE
Payer: MEDICARE

## 2022-12-13 VITALS
SYSTOLIC BLOOD PRESSURE: 152 MMHG | HEART RATE: 80 BPM | TEMPERATURE: 98 F | DIASTOLIC BLOOD PRESSURE: 86 MMHG | RESPIRATION RATE: 16 BRPM | OXYGEN SATURATION: 100 %

## 2022-12-13 VITALS
SYSTOLIC BLOOD PRESSURE: 148 MMHG | DIASTOLIC BLOOD PRESSURE: 99 MMHG | WEIGHT: 179.9 LBS | RESPIRATION RATE: 16 BRPM | OXYGEN SATURATION: 100 % | TEMPERATURE: 98 F | HEART RATE: 75 BPM

## 2022-12-13 DIAGNOSIS — Z90.710 ACQUIRED ABSENCE OF BOTH CERVIX AND UTERUS: Chronic | ICD-10-CM

## 2022-12-13 LAB
ALBUMIN SERPL ELPH-MCNC: 3.1 G/DL — LOW (ref 3.3–5)
ALP SERPL-CCNC: 91 U/L — SIGNIFICANT CHANGE UP (ref 40–120)
ALT FLD-CCNC: 27 U/L — SIGNIFICANT CHANGE UP (ref 12–78)
ANION GAP SERPL CALC-SCNC: 5 MMOL/L — SIGNIFICANT CHANGE UP (ref 5–17)
APTT BLD: 20 SEC — LOW (ref 27.5–35.5)
AST SERPL-CCNC: 18 U/L — SIGNIFICANT CHANGE UP (ref 15–37)
BASOPHILS # BLD AUTO: 0.04 K/UL — SIGNIFICANT CHANGE UP (ref 0–0.2)
BASOPHILS NFR BLD AUTO: 0.4 % — SIGNIFICANT CHANGE UP (ref 0–2)
BILIRUB SERPL-MCNC: 0.3 MG/DL — SIGNIFICANT CHANGE UP (ref 0.2–1.2)
BUN SERPL-MCNC: 10 MG/DL — SIGNIFICANT CHANGE UP (ref 7–23)
CALCIUM SERPL-MCNC: 9.1 MG/DL — SIGNIFICANT CHANGE UP (ref 8.5–10.1)
CHLORIDE SERPL-SCNC: 107 MMOL/L — SIGNIFICANT CHANGE UP (ref 96–108)
CO2 SERPL-SCNC: 27 MMOL/L — SIGNIFICANT CHANGE UP (ref 22–31)
CREAT SERPL-MCNC: 0.79 MG/DL — SIGNIFICANT CHANGE UP (ref 0.5–1.3)
EGFR: 77 ML/MIN/1.73M2 — SIGNIFICANT CHANGE UP
EOSINOPHIL # BLD AUTO: 0.17 K/UL — SIGNIFICANT CHANGE UP (ref 0–0.5)
EOSINOPHIL NFR BLD AUTO: 1.8 % — SIGNIFICANT CHANGE UP (ref 0–6)
GLUCOSE SERPL-MCNC: 151 MG/DL — HIGH (ref 70–99)
HCT VFR BLD CALC: 45 % — SIGNIFICANT CHANGE UP (ref 34.5–45)
HGB BLD-MCNC: 14.3 G/DL — SIGNIFICANT CHANGE UP (ref 11.5–15.5)
IMM GRANULOCYTES NFR BLD AUTO: 0.3 % — SIGNIFICANT CHANGE UP (ref 0–0.9)
INR BLD: 1.07 RATIO — SIGNIFICANT CHANGE UP (ref 0.88–1.16)
LYMPHOCYTES # BLD AUTO: 2.55 K/UL — SIGNIFICANT CHANGE UP (ref 1–3.3)
LYMPHOCYTES # BLD AUTO: 26.6 % — SIGNIFICANT CHANGE UP (ref 13–44)
MCHC RBC-ENTMCNC: 29.6 PG — SIGNIFICANT CHANGE UP (ref 27–34)
MCHC RBC-ENTMCNC: 31.8 GM/DL — LOW (ref 32–36)
MCV RBC AUTO: 93.2 FL — SIGNIFICANT CHANGE UP (ref 80–100)
MONOCYTES # BLD AUTO: 0.6 K/UL — SIGNIFICANT CHANGE UP (ref 0–0.9)
MONOCYTES NFR BLD AUTO: 6.3 % — SIGNIFICANT CHANGE UP (ref 2–14)
NEUTROPHILS # BLD AUTO: 6.18 K/UL — SIGNIFICANT CHANGE UP (ref 1.8–7.4)
NEUTROPHILS NFR BLD AUTO: 64.6 % — SIGNIFICANT CHANGE UP (ref 43–77)
NRBC # BLD: 0 /100 WBCS — SIGNIFICANT CHANGE UP (ref 0–0)
OB PNL STL: POSITIVE
PLATELET # BLD AUTO: 205 K/UL — SIGNIFICANT CHANGE UP (ref 150–400)
POTASSIUM SERPL-MCNC: 4 MMOL/L — SIGNIFICANT CHANGE UP (ref 3.5–5.3)
POTASSIUM SERPL-SCNC: 4 MMOL/L — SIGNIFICANT CHANGE UP (ref 3.5–5.3)
PROT SERPL-MCNC: 7 G/DL — SIGNIFICANT CHANGE UP (ref 6–8.3)
PROTHROM AB SERPL-ACNC: 12.5 SEC — SIGNIFICANT CHANGE UP (ref 10.5–13.4)
RBC # BLD: 4.83 M/UL — SIGNIFICANT CHANGE UP (ref 3.8–5.2)
RBC # FLD: 12.7 % — SIGNIFICANT CHANGE UP (ref 10.3–14.5)
SARS-COV-2 RNA SPEC QL NAA+PROBE: SIGNIFICANT CHANGE UP
SODIUM SERPL-SCNC: 139 MMOL/L — SIGNIFICANT CHANGE UP (ref 135–145)
WBC # BLD: 9.57 K/UL — SIGNIFICANT CHANGE UP (ref 3.8–10.5)
WBC # FLD AUTO: 9.57 K/UL — SIGNIFICANT CHANGE UP (ref 3.8–10.5)

## 2022-12-13 PROCEDURE — 85025 COMPLETE CBC W/AUTO DIFF WBC: CPT

## 2022-12-13 PROCEDURE — 87635 SARS-COV-2 COVID-19 AMP PRB: CPT

## 2022-12-13 PROCEDURE — 74176 CT ABD & PELVIS W/O CONTRAST: CPT | Mod: MA

## 2022-12-13 PROCEDURE — 86900 BLOOD TYPING SEROLOGIC ABO: CPT

## 2022-12-13 PROCEDURE — 74176 CT ABD & PELVIS W/O CONTRAST: CPT | Mod: 26,MA

## 2022-12-13 PROCEDURE — 82272 OCCULT BLD FECES 1-3 TESTS: CPT

## 2022-12-13 PROCEDURE — 85730 THROMBOPLASTIN TIME PARTIAL: CPT

## 2022-12-13 PROCEDURE — 86850 RBC ANTIBODY SCREEN: CPT

## 2022-12-13 PROCEDURE — 99284 EMERGENCY DEPT VISIT MOD MDM: CPT | Mod: FS

## 2022-12-13 PROCEDURE — 99285 EMERGENCY DEPT VISIT HI MDM: CPT | Mod: 25

## 2022-12-13 PROCEDURE — 85610 PROTHROMBIN TIME: CPT

## 2022-12-13 PROCEDURE — 36415 COLL VENOUS BLD VENIPUNCTURE: CPT

## 2022-12-13 PROCEDURE — 86901 BLOOD TYPING SEROLOGIC RH(D): CPT

## 2022-12-13 PROCEDURE — 80053 COMPREHEN METABOLIC PANEL: CPT

## 2022-12-13 NOTE — ED PROVIDER NOTE - OBJECTIVE STATEMENT
75 yo F PMHx advanced dementia nonverbal, GERD presents to ED with  for evaluation of bloody stool x last night.  reports "mucous and blood mixed with stool" - states pt is otherwise at her baseline.

## 2022-12-13 NOTE — ED PROVIDER NOTE - CARE PROVIDER_API CALL
18 Tato Hammonds (DO)  Gastroenterology; Internal Medicine  [address] [place] [address]  Phone: [phone]  Fax: [phone]  Follow Up Time:

## 2022-12-13 NOTE — ED PROVIDER NOTE - CLINICAL SUMMARY MEDICAL DECISION MAKING FREE TEXT BOX
76-year-old female with severe dementia nonverbal from home presents to the ER with bloody stools x1 day as per .  Patient unable to provide any information secondary to mental status.  Will check labs, UA, CT rule out colitis, GI bleed, symptomatic anemia. 76-year-old female with severe dementia nonverbal from home presents to the ER with bloody stools x1 day as per .  Patient unable to provide any information secondary to mental status.  Will check labs, CT rule out colitis, GI bleed, symptomatic anemia.

## 2022-12-13 NOTE — ED ADULT NURSE NOTE - OBJECTIVE STATEMENT
Pt alert and confused, does not follow commands,  reports blood in stool, no signs of active bleeding noted.

## 2022-12-13 NOTE — ED PROVIDER NOTE - PATIENT PORTAL LINK FT
You can access the FollowMyHealth Patient Portal offered by [facility] by registering at the following website: [email or web address]. By joining [employer]’s FollowMyHealth portal, you will also be able to view your health information using other applications (apps) compatible with our system.

## 2022-12-13 NOTE — ED PROVIDER NOTE - NSFOLLOWUPINSTRUCTIONS_ED_ALL_ED_FT
1) Follow up with GASTROENTEROLOGY in 1-2 days.  2) Return to the ED immediately for new or worsening symptoms as we discussed.      Rectal Bleeding       Rectal bleeding is when blood passes out of the opening between the buttocks (anus). People with rectal bleeding may notice bright red blood in their underwear or in the toilet after having a bowel movement. They may also have blood mixed with their stool (feces), or dark red or black stools.    Rectal bleeding is usually a sign that something is wrong. Many things can cause rectal bleeding, including:  •Diverticulosis. This is a condition in which pockets or sacs project from the bowel.      •Hemorrhoids. These are blood vessels around the anus or inside the rectum that are larger than normal.      •Anal fissures. This is a tear in the anus.      •Proctitis and colitis. These are conditions in which the rectum, colon, or anus become inflamed.      •Polyps. These are growths that can be cancerous (malignant) or noncancerous (benign).      •Infections of the intestines.      •Fistulas. These are abnormal openings in the rectum and anus.      •Rectal prolapse. This is when a part of the rectum sticks out from the anus.        Follow these instructions at home:    Pay attention to any changes in your symptoms. Take these actions to help reduce bleeding and discomfort:    Medicines     •Take over-the-counter and prescription medicines only as told by your health care provider.      •Ask your health care provider about changing or stopping your regular medicines or supplements. This is especially important if you are taking blood thinners. Medicines that thin the blood can make rectal bleeding worse.        Managing constipation      Your condition may cause constipation. To prevent or treat constipation, or to help make your stools soft, you may need to:  •Drink enough fluid to keep your urine pale yellow.      •Take over-the-counter or prescription medicines.      •Eat foods that are high in fiber, such as beans, whole grains, and fresh fruits and vegetables. Ask your health care provider if you need a supplement to give you more fiber.      •Limit foods that are high in fat and processed sugars, such as fried or sweet foods.      General instructions     •Try not to strain when having a bowel movement.      •Try taking a warm bath. This may help to soothe any pain in your rectum.      •Keep all follow-up visits as told by your health care provider. This is important.        Contact a health care provider if you:    •Have pain or tenderness in your abdomen.      •Have a fever.      •Have weakness.      •Have nausea.      •Cannot have a bowel movement.        Get help right away if you have:    •New or increased rectal bleeding.      •Black or dark red stools.      •Vomit with blood or something that looks like coffee grounds.      •A fainting episode.      •Severe pain in your rectum.        Summary    •Rectal bleeding is usually a sign that something is wrong. This condition should be evaluated by a health care provider.      •Eat a diet that is high in fiber. This will help keep your stools soft, making it easier to pass stools without straining.      •Medicines that thin the blood can make rectal bleeding worse.      •Get help right away if you have new or increased rectal bleeding, black or dark red stools, blood in your vomit, an episode of fainting, or severe pain in your rectum.      This information is not intended to replace advice given to you by your health care provider. Make sure you discuss any questions you have with your health care provider.      Document Revised: 11/18/2020 Document Reviewed: 11/18/2020    [employer] Patient Education © 2022 [employer]

## 2022-12-13 NOTE — ED PROVIDER NOTE - PROGRESS NOTE DETAILS
pt at her baseline as per .   agrees with plan for outpatient GI follow up.   Discussed the results of all diagnostic testing in ED with  and copies of all reports given.   Pt was given an opportunity to have all questions answered to satisfaction.  Discussed the importance of prompt, close medical follow-up. ED return precautions discussed at length.  Pt verbalizes agreement and understanding of plan and ED return precautions. Pt well appearing, stable for DC home. No emergent concerns at this time.

## 2022-12-13 NOTE — ED PROVIDER NOTE - NS ED ATTENDING STATEMENT MOD
This was a shared visit with the SAWYER. I reviewed and verified the documentation and independently performed the documented:

## 2023-01-23 NOTE — ED ADULT NURSE NOTE - BREATH SOUNDS, MLM
[Conventional grooming and hygiene] : Conventional grooming and hygiene [Calm, cooperative] : Calm, cooperative [No unusual behaviors, movements, etc.] : No unusual behaviors, movements, etc. [Normal Rate/Tone/Volume] : Normal Rate/Tone/Volume [Logical, Goal Directed] : Logical, Goal Directed [Behavior plan developed] : Behavior plan developed [Strategies to improve adherence identified] : Strategies to improve adherence identified [Identify, practice refine strategies to promote adherence to regimen] : Identify, practice refine strategies to promote adherence to regimen [Coping skills training to overcome social/emotional barriers to adherence] : Coping skills training to overcome social/emotional barriers to adherence [Normal Range] : Normal Range [Euthymic] : Euthymic [FreeTextEntry6] : stressed [de-identified] : inconsistent [de-identified] : emotional eating with ongoing ROCÍO sxs [FreeTextEntry1] : 58 yr old female who had sleeve gastrectomy in 2020 and reportedly lost 50-60 lbs and re-gained 20 lbs seeking weight loss counseling. [de-identified] : Psychological factors (physical inactivity, poor dietary choices) affecting other medical conditions (obesity and associated medical sequelae)\par Obesity\par ROCÍO [FreeTextEntry3] : Encouraged pt to reconsider her PCP's recommendation re: SSRI medication for treatment of her anxiety, especially given continued high frequency of Xanax use.  Also recommended CBT for treatment of anxiety. [FreeTextEntry4] : Follow up with writer x 2-4 weeks.  Cont with [facility] physician.  \par -self monitor all food intake and contextual factors\par - purchase and prepare her own food [FreeTextEntry5] : compliance with dietary and behavioral recommendations Clear

## 2024-11-05 NOTE — PATIENT PROFILE ADULT. - FUNCTIONAL SCREEN CURRENT LEVEL: COMMUNICATION, MLM
Reviewed by QA and ready to move forward.      (2) difficulty understanding and speaking (not related to language barrier)
